# Patient Record
Sex: FEMALE | Race: WHITE | NOT HISPANIC OR LATINO
[De-identification: names, ages, dates, MRNs, and addresses within clinical notes are randomized per-mention and may not be internally consistent; named-entity substitution may affect disease eponyms.]

---

## 2018-01-31 ENCOUNTER — TRANSCRIPTION ENCOUNTER (OUTPATIENT)
Age: 58
End: 2018-01-31

## 2018-07-22 PROBLEM — Z00.00 ENCOUNTER FOR PREVENTIVE HEALTH EXAMINATION: Status: ACTIVE | Noted: 2018-07-22

## 2018-08-20 ENCOUNTER — RESULT CHARGE (OUTPATIENT)
Age: 58
End: 2018-08-20

## 2018-08-20 ENCOUNTER — APPOINTMENT (OUTPATIENT)
Dept: OBGYN | Facility: CLINIC | Age: 58
End: 2018-08-20
Payer: COMMERCIAL

## 2018-08-20 VITALS
OXYGEN SATURATION: 98 % | TEMPERATURE: 98.7 F | SYSTOLIC BLOOD PRESSURE: 100 MMHG | HEIGHT: 64 IN | BODY MASS INDEX: 50.02 KG/M2 | RESPIRATION RATE: 18 BRPM | WEIGHT: 293 LBS | HEART RATE: 64 BPM | DIASTOLIC BLOOD PRESSURE: 62 MMHG

## 2018-08-20 PROCEDURE — 81003 URINALYSIS AUTO W/O SCOPE: CPT | Mod: QW

## 2018-08-20 PROCEDURE — 82270 OCCULT BLOOD FECES: CPT

## 2018-08-20 PROCEDURE — 99386 PREV VISIT NEW AGE 40-64: CPT

## 2018-09-03 LAB
BILIRUB UR QL STRIP: NORMAL
CLARITY UR: CLEAR
COLLECTION METHOD: NORMAL
CYTOLOGY CVX/VAG DOC THIN PREP: NORMAL
GLUCOSE UR-MCNC: NORMAL
HCG UR QL: 0.2 EU/DL
HGB UR QL STRIP.AUTO: NORMAL
HPV HIGH+LOW RISK DNA PNL CVX: NOT DETECTED
KETONES UR-MCNC: NORMAL
LEUKOCYTE ESTERASE UR QL STRIP: NORMAL
NITRITE UR QL STRIP: NORMAL
PH UR STRIP: 5.5
PROT UR STRIP-MCNC: NORMAL
SP GR UR STRIP: NORMAL

## 2018-09-09 ENCOUNTER — FORM ENCOUNTER (OUTPATIENT)
Age: 58
End: 2018-09-09

## 2018-09-10 ENCOUNTER — APPOINTMENT (OUTPATIENT)
Dept: RADIOLOGY | Facility: CLINIC | Age: 58
End: 2018-09-10
Payer: COMMERCIAL

## 2018-09-10 ENCOUNTER — OUTPATIENT (OUTPATIENT)
Dept: OUTPATIENT SERVICES | Facility: HOSPITAL | Age: 58
LOS: 1 days | End: 2018-09-10
Payer: COMMERCIAL

## 2018-09-10 ENCOUNTER — APPOINTMENT (OUTPATIENT)
Dept: MAMMOGRAPHY | Facility: CLINIC | Age: 58
End: 2018-09-10
Payer: COMMERCIAL

## 2018-09-10 DIAGNOSIS — Z00.8 ENCOUNTER FOR OTHER GENERAL EXAMINATION: ICD-10-CM

## 2018-09-10 DIAGNOSIS — R93.8 ABNORMAL FINDINGS ON DIAGNOSTIC IMAGING OF OTHER SPECIFIED BODY STRUCTURES: ICD-10-CM

## 2018-09-10 PROCEDURE — 77080 DXA BONE DENSITY AXIAL: CPT

## 2018-09-10 PROCEDURE — 77063 BREAST TOMOSYNTHESIS BI: CPT | Mod: 26

## 2018-09-10 PROCEDURE — 77067 SCR MAMMO BI INCL CAD: CPT

## 2018-09-10 PROCEDURE — 77067 SCR MAMMO BI INCL CAD: CPT | Mod: 26

## 2018-09-10 PROCEDURE — 77080 DXA BONE DENSITY AXIAL: CPT | Mod: 26

## 2018-09-10 PROCEDURE — 77063 BREAST TOMOSYNTHESIS BI: CPT

## 2018-09-11 ENCOUNTER — RESULT REVIEW (OUTPATIENT)
Age: 58
End: 2018-09-11

## 2018-10-04 ENCOUNTER — RESULT REVIEW (OUTPATIENT)
Age: 58
End: 2018-10-04

## 2018-10-07 ENCOUNTER — FORM ENCOUNTER (OUTPATIENT)
Age: 58
End: 2018-10-07

## 2018-10-08 ENCOUNTER — OUTPATIENT (OUTPATIENT)
Dept: OUTPATIENT SERVICES | Facility: HOSPITAL | Age: 58
LOS: 1 days | End: 2018-10-08
Payer: COMMERCIAL

## 2018-10-08 ENCOUNTER — APPOINTMENT (OUTPATIENT)
Dept: MAMMOGRAPHY | Facility: CLINIC | Age: 58
End: 2018-10-08
Payer: COMMERCIAL

## 2018-10-08 ENCOUNTER — APPOINTMENT (OUTPATIENT)
Dept: ULTRASOUND IMAGING | Facility: CLINIC | Age: 58
End: 2018-10-08
Payer: COMMERCIAL

## 2018-10-08 DIAGNOSIS — Z00.8 ENCOUNTER FOR OTHER GENERAL EXAMINATION: ICD-10-CM

## 2018-10-08 PROCEDURE — G0279: CPT | Mod: 26

## 2018-10-08 PROCEDURE — 77065 DX MAMMO INCL CAD UNI: CPT

## 2018-10-08 PROCEDURE — G0279: CPT

## 2018-10-08 PROCEDURE — 77065 DX MAMMO INCL CAD UNI: CPT | Mod: 26,RT

## 2018-10-23 ENCOUNTER — FORM ENCOUNTER (OUTPATIENT)
Age: 58
End: 2018-10-23

## 2018-10-24 ENCOUNTER — APPOINTMENT (OUTPATIENT)
Dept: ULTRASOUND IMAGING | Facility: CLINIC | Age: 58
End: 2018-10-24
Payer: COMMERCIAL

## 2018-10-24 ENCOUNTER — OUTPATIENT (OUTPATIENT)
Dept: OUTPATIENT SERVICES | Facility: HOSPITAL | Age: 58
LOS: 1 days | End: 2018-10-24
Payer: COMMERCIAL

## 2018-10-24 DIAGNOSIS — Z00.8 ENCOUNTER FOR OTHER GENERAL EXAMINATION: ICD-10-CM

## 2018-10-24 PROCEDURE — 76642 ULTRASOUND BREAST LIMITED: CPT

## 2018-10-24 PROCEDURE — 76642 ULTRASOUND BREAST LIMITED: CPT | Mod: 26,RT

## 2019-12-17 ENCOUNTER — APPOINTMENT (OUTPATIENT)
Dept: NEUROLOGY | Facility: CLINIC | Age: 59
End: 2019-12-17
Payer: COMMERCIAL

## 2019-12-17 VITALS
HEART RATE: 75 BPM | SYSTOLIC BLOOD PRESSURE: 103 MMHG | WEIGHT: 280 LBS | BODY MASS INDEX: 47.8 KG/M2 | DIASTOLIC BLOOD PRESSURE: 64 MMHG | HEIGHT: 64 IN

## 2019-12-17 DIAGNOSIS — M54.2 CERVICALGIA: ICD-10-CM

## 2019-12-17 DIAGNOSIS — G89.29 LOW BACK PAIN: ICD-10-CM

## 2019-12-17 DIAGNOSIS — G89.29 CERVICALGIA: ICD-10-CM

## 2019-12-17 DIAGNOSIS — M54.5 LOW BACK PAIN: ICD-10-CM

## 2019-12-17 PROCEDURE — 99204 OFFICE O/P NEW MOD 45 MIN: CPT

## 2019-12-17 RX ORDER — METFORMIN HYDROCHLORIDE 500 MG/1
500 TABLET, COATED ORAL
Refills: 0 | Status: ACTIVE | COMMUNITY

## 2019-12-17 NOTE — DISCUSSION/SUMMARY
[FreeTextEntry1] : Patient with a history of recent diagnosis of diabetes presented with recent history of dizziness lightheadedness, and trouble processing and episodic blanking out with memory lapses rule out CNS pathology.\par \par Rule out seizure episodes or vascular events.\par \par Get copies of cardiac workup in your office.\par \par MRI of the brain patient states she is claustrophobic need to go to standup MRI.\par \par Repeat B12 levels low-normal range 245.\par \par EEG to rule out CNS pathology.\par \par If the above workup is negative COG testing.\par \par Carotid Doppler done in your office and get reports.\par \par Adequate control of blood sugar and cholesterol.\par \par Followup evaluation after workup is completed.\par \par Recommend patient keep a log of the events.\par \par Followup evaluation after workup is completed.\par \par Patient education provided and discussed with the patient.\par \par \par \par

## 2019-12-17 NOTE — HISTORY OF PRESENT ILLNESS
[FreeTextEntry1] : She is 59-year-old patient coming here for evaluation for episodes of dizziness, lightheadedness and trouble processing things and blanking out periodically for the last 6-7 months on and off episodically.\par \par Patient denies of any loss of consciousness associated with the symptoms no headaches no focal weakness. Most the symptoms are with episodic memory lapses with foggy sensation in the head with inability to focus or lapses unable to get words out right with difficulty while driving suddenly unable to recall where she was going or recall the way to the places. Denies of any trouble with the work. No trouble concentration or finishing up tasks at work.\par \par No prior problems in the past symptoms started only in the summer and aggravated recently for  last 4-6 weeks. No history of trauma or injuries associated with the symptoms. Taken off Crestor  which was started more than a year ago for elevated cholesterol. Recently metformin was in twice a day but her symptoms started before the doses were adjusted. CAT scan brain was done did not reveal an acute pathology. Lab work showed elevated cholesterol and low normal B12 levels hemoglobin A1c levels at 6.9.

## 2019-12-17 NOTE — REASON FOR VISIT
[Consultation] : a consultation visit [FreeTextEntry1] : Evaluation for Pt with episodes of Dizziness, lightheadedness and trouble processing things and blanking out

## 2019-12-17 NOTE — REVIEW OF SYSTEMS
[Decr. Concentrating Ability] : decreased concentrating ability [Memory Lapses or Loss] : memory loss [Dizziness] : dizziness [Changed Thought Patterns] : changed thought patterns [Lightheadedness] : lightheadedness [Negative] : Endocrine

## 2019-12-17 NOTE — PHYSICAL EXAM
[General Appearance - Alert] : alert [General Appearance - In No Acute Distress] : in no acute distress [Impaired Insight] : insight and judgment were intact [Oriented To Time, Place, And Person] : oriented to person, place, and time [Affect] : the affect was normal [Person] : oriented to person [Time] : oriented to time [Place] : oriented to place [Concentration Intact] : normal concentrating ability [Visual Intact] : visual attention was ~T not ~L decreased [Naming Objects] : no difficulty naming common objects [Writing A Sentence] : no difficulty writing a sentence [Repeating Phrases] : no difficulty repeating a phrase [Fluency] : fluency intact [Comprehension] : comprehension intact [Cranial Nerves Optic (II)] : visual acuity intact bilaterally,  visual fields full to confrontation, pupils equal round and reactive to light [Past History] : adequate knowledge of personal past history [Reading] : reading intact [Cranial Nerves Oculomotor (III)] : extraocular motion intact [Cranial Nerves Trigeminal (V)] : facial sensation intact symmetrically [Cranial Nerves Facial (VII)] : face symmetrical [Cranial Nerves Glossopharyngeal (IX)] : tongue and palate midline [Cranial Nerves Accessory (XI - Cranial And Spinal)] : head turning and shoulder shrug symmetric [Cranial Nerves Vestibulocochlear (VIII)] : hearing was intact bilaterally [Motor Strength] : muscle strength was normal in all four extremities [Cranial Nerves Hypoglossal (XII)] : there was no tongue deviation with protrusion [No Muscle Atrophy] : normal bulk in all four extremities [Balance] : balance was intact [Sensation Tactile Decrease] : light touch was intact [Past-pointing] : there was no past-pointing [Tremor] : no tremor present [2+] : Brachioradialis left 2+ [Plantar Reflex Right Only] : normal on the right [1+] : Ankle jerk left 1+ [FreeTextEntry4] : MMSE scores 30/30 [Plantar Reflex Left Only] : normal on the left [Sclera] : the sclera and conjunctiva were normal [Extraocular Movements] : extraocular movements were intact [PERRL With Normal Accommodation] : pupils were equal in size, round, reactive to light, with normal accommodation [Outer Ear] : the ears and nose were normal in appearance [Neck Cervical Mass (___cm)] : no neck mass was observed [Neck Appearance] : the appearance of the neck was normal [Oropharynx] : the oropharynx was normal [Thyroid Diffuse Enlargement] : the thyroid was not enlarged [Jugular Venous Distention Increased] : there was no jugular-venous distention [Thyroid Nodule] : there were no palpable thyroid nodules [Auscultation Breath Sounds / Voice Sounds] : lungs were clear to auscultation bilaterally [Heart Rate And Rhythm] : heart rate was normal and rhythm regular [Murmurs] : no murmurs [Heart Sounds] : normal S1 and S2 [Heart Sounds Gallop] : no gallops [Full Pulse] : the pedal pulses are present [Heart Sounds Pericardial Friction Rub] : no pericardial rub [Edema] : there was no peripheral edema [Bowel Sounds] : normal bowel sounds [Abdomen Soft] : soft [Abdomen Tenderness] : non-tender [Abdomen Mass (___ Cm)] : no abdominal mass palpated [No CVA Tenderness] : no ~M costovertebral angle tenderness [Nail Clubbing] : no clubbing  or cyanosis of the fingernails [No Spinal Tenderness] : no spinal tenderness [Abnormal Walk] : normal gait [Musculoskeletal - Swelling] : no joint swelling seen [Motor Tone] : muscle strength and tone were normal [Skin Color & Pigmentation] : normal skin color and pigmentation [Skin Turgor] : normal skin turgor [] : no rash

## 2019-12-27 ENCOUNTER — APPOINTMENT (OUTPATIENT)
Dept: NEUROLOGY | Facility: CLINIC | Age: 59
End: 2019-12-27
Payer: COMMERCIAL

## 2019-12-27 PROCEDURE — 95816 EEG AWAKE AND DROWSY: CPT

## 2020-01-08 ENCOUNTER — APPOINTMENT (OUTPATIENT)
Dept: OBGYN | Facility: CLINIC | Age: 60
End: 2020-01-08
Payer: COMMERCIAL

## 2020-01-08 VITALS
WEIGHT: 280 LBS | BODY MASS INDEX: 47.8 KG/M2 | SYSTOLIC BLOOD PRESSURE: 110 MMHG | HEIGHT: 64 IN | DIASTOLIC BLOOD PRESSURE: 70 MMHG

## 2020-01-08 DIAGNOSIS — Z86.39 PERSONAL HISTORY OF OTHER ENDOCRINE, NUTRITIONAL AND METABOLIC DISEASE: ICD-10-CM

## 2020-01-08 DIAGNOSIS — Z87.42 PERSONAL HISTORY OF OTHER DISEASES OF THE FEMALE GENITAL TRACT: ICD-10-CM

## 2020-01-08 DIAGNOSIS — Z01.419 ENCOUNTER FOR GYNECOLOGICAL EXAMINATION (GENERAL) (ROUTINE) W/OUT ABNORMAL FINDINGS: ICD-10-CM

## 2020-01-08 PROCEDURE — 82270 OCCULT BLOOD FECES: CPT

## 2020-01-08 PROCEDURE — 99396 PREV VISIT EST AGE 40-64: CPT

## 2020-01-08 NOTE — COUNSELING
[Exercise] : exercise [Breast Self Exam] : breast self exam [Nutrition] : nutrition [Vitamins/Supplements] : vitamins/supplements [Weight Management] : weight management

## 2020-01-08 NOTE — PHYSICAL EXAM
[Alert] : alert [Awake] : awake [Acute Distress] : no acute distress [Mass] : no breast mass [Nipple Discharge] : no nipple discharge [Axillary LAD] : no axillary lymphadenopathy [Soft] : soft [Tender] : non tender [Oriented x3] : oriented to person, place, and time [Normal] : uterus [No Bleeding] : there was no active vaginal bleeding [No Tenderness] : no rectal tenderness [Uterine Adnexae] : were not tender and not enlarged [Pap Obtained] : a Pap smear was performed [Occult Blood] : occult blood test from digital rectal exam was negative [Nl Sphincter Tone] : normal sphincter tone

## 2020-01-08 NOTE — CHIEF COMPLAINT
[Annual Visit] : annual visit [FreeTextEntry1] : Patient is a 59-year-old female presents for routine annual gynecologic examination. Patient states her last menstrual periods 8 years ago. Patient states she had an episode of one week of bleeding in November 2019. Discussed this issue with patient advised workup and evaluation for postmenopausal bleeding including pelvic ultrasound and an in office hysteroscopy and endometrial biopsy. Risks benefits and alternatives reviewed. Patient's last mammogram was October 2018

## 2020-01-10 LAB — HPV HIGH+LOW RISK DNA PNL CVX: NOT DETECTED

## 2020-02-04 ENCOUNTER — FORM ENCOUNTER (OUTPATIENT)
Age: 60
End: 2020-02-04

## 2020-02-05 ENCOUNTER — OUTPATIENT (OUTPATIENT)
Dept: OUTPATIENT SERVICES | Facility: HOSPITAL | Age: 60
LOS: 1 days | End: 2020-02-05
Payer: COMMERCIAL

## 2020-02-05 ENCOUNTER — APPOINTMENT (OUTPATIENT)
Dept: ULTRASOUND IMAGING | Facility: CLINIC | Age: 60
End: 2020-02-05
Payer: COMMERCIAL

## 2020-02-05 ENCOUNTER — APPOINTMENT (OUTPATIENT)
Dept: MAMMOGRAPHY | Facility: CLINIC | Age: 60
End: 2020-02-05
Payer: COMMERCIAL

## 2020-02-05 DIAGNOSIS — Z00.8 ENCOUNTER FOR OTHER GENERAL EXAMINATION: ICD-10-CM

## 2020-02-05 PROCEDURE — 77067 SCR MAMMO BI INCL CAD: CPT

## 2020-02-05 PROCEDURE — 76830 TRANSVAGINAL US NON-OB: CPT | Mod: 26

## 2020-02-05 PROCEDURE — 77063 BREAST TOMOSYNTHESIS BI: CPT | Mod: 26

## 2020-02-05 PROCEDURE — 76856 US EXAM PELVIC COMPLETE: CPT | Mod: 26

## 2020-02-05 PROCEDURE — 77067 SCR MAMMO BI INCL CAD: CPT | Mod: 26

## 2020-02-05 PROCEDURE — 76830 TRANSVAGINAL US NON-OB: CPT

## 2020-02-05 PROCEDURE — 76856 US EXAM PELVIC COMPLETE: CPT

## 2020-02-05 PROCEDURE — 76641 ULTRASOUND BREAST COMPLETE: CPT | Mod: 26,50

## 2020-02-05 PROCEDURE — 77063 BREAST TOMOSYNTHESIS BI: CPT

## 2020-02-05 PROCEDURE — 76641 ULTRASOUND BREAST COMPLETE: CPT

## 2020-02-10 ENCOUNTER — APPOINTMENT (OUTPATIENT)
Dept: NEUROLOGY | Facility: CLINIC | Age: 60
End: 2020-02-10
Payer: COMMERCIAL

## 2020-02-10 VITALS
HEIGHT: 64 IN | BODY MASS INDEX: 46.1 KG/M2 | WEIGHT: 270 LBS | HEART RATE: 78 BPM | SYSTOLIC BLOOD PRESSURE: 126 MMHG | DIASTOLIC BLOOD PRESSURE: 80 MMHG

## 2020-02-10 DIAGNOSIS — R42 DIZZINESS AND GIDDINESS: ICD-10-CM

## 2020-02-10 DIAGNOSIS — R68.89 OTHER GENERAL SYMPTOMS AND SIGNS: ICD-10-CM

## 2020-02-10 DIAGNOSIS — R41.3 OTHER AMNESIA: ICD-10-CM

## 2020-02-10 DIAGNOSIS — R41.89 OTHER SYMPTOMS AND SIGNS INVOLVING COGNITIVE FUNCTIONS AND AWARENESS: ICD-10-CM

## 2020-02-10 DIAGNOSIS — E53.8 DEFICIENCY OF OTHER SPECIFIED B GROUP VITAMINS: ICD-10-CM

## 2020-02-10 PROCEDURE — 96132 NRPSYC TST EVAL PHYS/QHP 1ST: CPT | Mod: 59

## 2020-02-10 PROCEDURE — 99214 OFFICE O/P EST MOD 30 MIN: CPT

## 2020-02-10 NOTE — DISCUSSION/SUMMARY
[FreeTextEntry1] : She recently is to have diabetes and complaining about dizziness and lightheadedness and trouble processing and episodic memory lapses.\par \par Herself her lab work and neurologic workup only positive for B12 level low normal range. Rest of her workup had been negative.\par \par Her neuropsychological COG score were above average.\par \par Recommendation to continue vitamin B12 supplements.\par \par Would not recommend any medications at this time.\par \par Followup evaluation in 3 months or as needed.\par \par Patient education provided.\par \par For dizziness recommend ENT evaluation and orthostatic blood pressure recording.\par \par Followup with you for other medical problems.

## 2020-02-10 NOTE — PHYSICAL EXAM
[General Appearance - In No Acute Distress] : in no acute distress [Oriented To Time, Place, And Person] : oriented to person, place, and time [General Appearance - Alert] : alert [Impaired Insight] : insight and judgment were intact [Affect] : the affect was normal [Person] : oriented to person [Time] : oriented to time [Place] : oriented to place [Naming Objects] : no difficulty naming common objects [Concentration Intact] : normal concentrating ability [Visual Intact] : visual attention was ~T not ~L decreased [Writing A Sentence] : no difficulty writing a sentence [Repeating Phrases] : no difficulty repeating a phrase [Reading] : reading intact [Comprehension] : comprehension intact [Fluency] : fluency intact [Cranial Nerves Optic (II)] : visual acuity intact bilaterally,  visual fields full to confrontation, pupils equal round and reactive to light [Past History] : adequate knowledge of personal past history [Cranial Nerves Oculomotor (III)] : extraocular motion intact [Cranial Nerves Facial (VII)] : face symmetrical [Cranial Nerves Trigeminal (V)] : facial sensation intact symmetrically [Cranial Nerves Glossopharyngeal (IX)] : tongue and palate midline [Cranial Nerves Vestibulocochlear (VIII)] : hearing was intact bilaterally [Cranial Nerves Accessory (XI - Cranial And Spinal)] : head turning and shoulder shrug symmetric [Cranial Nerves Hypoglossal (XII)] : there was no tongue deviation with protrusion [Motor Strength] : muscle strength was normal in all four extremities [Sensation Tactile Decrease] : light touch was intact [No Muscle Atrophy] : normal bulk in all four extremities [Balance] : balance was intact [Past-pointing] : there was no past-pointing [Tremor] : no tremor present [2+] : Brachioradialis left 2+ [Plantar Reflex Right Only] : normal on the right [1+] : Ankle jerk left 1+ [FreeTextEntry4] : MMSE scores 30/30 [Plantar Reflex Left Only] : normal on the left [Sclera] : the sclera and conjunctiva were normal [PERRL With Normal Accommodation] : pupils were equal in size, round, reactive to light, with normal accommodation [Extraocular Movements] : extraocular movements were intact [Outer Ear] : the ears and nose were normal in appearance [Oropharynx] : the oropharynx was normal [Neck Appearance] : the appearance of the neck was normal [Neck Cervical Mass (___cm)] : no neck mass was observed [Thyroid Diffuse Enlargement] : the thyroid was not enlarged [Jugular Venous Distention Increased] : there was no jugular-venous distention [Thyroid Nodule] : there were no palpable thyroid nodules [Auscultation Breath Sounds / Voice Sounds] : lungs were clear to auscultation bilaterally [Heart Rate And Rhythm] : heart rate was normal and rhythm regular [Murmurs] : no murmurs [Heart Sounds Gallop] : no gallops [Heart Sounds] : normal S1 and S2 [Full Pulse] : the pedal pulses are present [Heart Sounds Pericardial Friction Rub] : no pericardial rub [Abdomen Soft] : soft [Edema] : there was no peripheral edema [Bowel Sounds] : normal bowel sounds [Abdomen Tenderness] : non-tender [Abdomen Mass (___ Cm)] : no abdominal mass palpated [No CVA Tenderness] : no ~M costovertebral angle tenderness [No Spinal Tenderness] : no spinal tenderness [Abnormal Walk] : normal gait [Musculoskeletal - Swelling] : no joint swelling seen [Nail Clubbing] : no clubbing  or cyanosis of the fingernails [Skin Color & Pigmentation] : normal skin color and pigmentation [Skin Turgor] : normal skin turgor [Motor Tone] : muscle strength and tone were normal [] : no rash

## 2020-02-10 NOTE — HISTORY OF PRESENT ILLNESS
[FreeTextEntry1] : She is 59-year-old patient coming in for a followup evaluation for persistent dizziness with episodic memory lapses on for the sensation and inability to focus and memory lapses.\par \par Patient had EEG and MRI scan done did not reveal any acute pathology. Today patient coming here for COG testing and evaluated. No other focal symptoms.\par \par COG testing completed .

## 2020-02-10 NOTE — DATA REVIEWED
[de-identified] : Normal. [de-identified] : COG test  Global Cognitive scores 106.3\par More than 1 standard Deviation below average in the following domains: None\par Below average in the following cognitive domains : Motor skills 97.9\par Above  average in the following cognitive domains : \par Memory : 108.5\par Executive function :104.2\par Attention :107.3\par Information processing speed :112.8\par Visual spacial : 108.9\par Verbal function : 104.2\par Global Cognitive Score : above average. [de-identified] : Normal Brain \par bilat Maxillary Mucosal retention cysts

## 2020-02-10 NOTE — REVIEW OF SYSTEMS
[Memory Lapses or Loss] : memory loss [Changed Thought Patterns] : changed thought patterns [Decr. Concentrating Ability] : decreased concentrating ability [Dizziness] : dizziness [Lightheadedness] : lightheadedness [Negative] : Integumentary

## 2020-02-10 NOTE — REASON FOR VISIT
[FreeTextEntry1] : Pt coming for COG testing for memory evaluation [Follow-Up: _____] : a [unfilled] follow-up visit

## 2020-02-17 ENCOUNTER — APPOINTMENT (OUTPATIENT)
Dept: OBGYN | Facility: CLINIC | Age: 60
End: 2020-02-17
Payer: COMMERCIAL

## 2020-02-17 ENCOUNTER — LABORATORY RESULT (OUTPATIENT)
Age: 60
End: 2020-02-17

## 2020-02-17 VITALS — SYSTOLIC BLOOD PRESSURE: 130 MMHG | DIASTOLIC BLOOD PRESSURE: 80 MMHG

## 2020-02-17 DIAGNOSIS — R93.5 ABNORMAL FINDINGS ON DIAGNOSTIC IMAGING OF OTHER ABDOMINAL REGIONS, INCLUDING RETROPERITONEUM: ICD-10-CM

## 2020-02-17 DIAGNOSIS — N95.0 POSTMENOPAUSAL BLEEDING: ICD-10-CM

## 2020-02-17 DIAGNOSIS — R93.89 ABNORMAL FINDINGS ON DIAGNOSTIC IMAGING OF OTHER SPECIFIED BODY STRUCTURES: ICD-10-CM

## 2020-02-17 DIAGNOSIS — Z87.42 PERSONAL HISTORY OF OTHER DISEASES OF THE FEMALE GENITAL TRACT: ICD-10-CM

## 2020-02-17 PROCEDURE — 58558Z: CUSTOM

## 2020-02-17 NOTE — PROCEDURE
[Risks] : risks [Endometrial Biopsy] : Endometrial biopsy [Benefits] : benefits [Patient] : patient [Alternatives] : alternatives [Infection] : infection [Allergic Reaction] : allergic reaction [Uterine Perforation] : uterine perforation [Bleeding] : bleeding [Neg Pregnancy Test] : a pregnancy test was negative [CONSENT OBTAINED] : written consent was obtained prior to the procedure. [Pain] : pain [Betadine] : Betadine [Ibuprofen ___ mg] : ibuprofen [unfilled] ~Umg [Paracervical Block] : A paracervical block was performed using [___ mL Injected] : [unfilled] ~UmL [1%] : 1% [Without Epi] : without epinephrine [Tenaculum] : a single toothed tenaculum [Anteverted] : anteverted [Required Dilation] : required dilation [Moderate] : a moderate [Pipelle] : a Pipelle endometrial suction curette [Tolerated Well] : the patient tolerated the procedure well [Sent to Histology] : the specimen was place in buffered formalin and sent for pathlogy [de-identified] : Cytotec [No Complications] : there were no complications

## 2020-02-24 ENCOUNTER — APPOINTMENT (OUTPATIENT)
Dept: OBGYN | Facility: CLINIC | Age: 60
End: 2020-02-24
Payer: COMMERCIAL

## 2020-02-24 PROCEDURE — 99215 OFFICE O/P EST HI 40 MIN: CPT

## 2020-02-24 NOTE — CHIEF COMPLAINT
[Follow Up] : follow up GYN visit [FreeTextEntry1] : Patient is a 59-year-old female that presents with her  for a consultation on an urgent basis regarding her endometrial biopsy pathology results. Patient had a hysteroscopy and endometrial biopsy secondary complaints of postmenopausal bleeding. 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 This is also discussed with the patient and her  in detail. Patient advised to have a pelvic MRI and to schedule a gynecologic oncologist consultation. Referral given. Questions answered. Patient states she understands. Will be referred to nurse oncology navigator.\par \par 45 minutes of face time

## 2020-03-04 ENCOUNTER — APPOINTMENT (OUTPATIENT)
Dept: GYNECOLOGIC ONCOLOGY | Facility: CLINIC | Age: 60
End: 2020-03-04
Payer: COMMERCIAL

## 2020-03-04 VITALS — BODY MASS INDEX: 46.1 KG/M2 | WEIGHT: 270 LBS | HEIGHT: 64 IN

## 2020-03-04 DIAGNOSIS — E78.5 HYPERLIPIDEMIA, UNSPECIFIED: ICD-10-CM

## 2020-03-04 DIAGNOSIS — Z78.9 OTHER SPECIFIED HEALTH STATUS: ICD-10-CM

## 2020-03-04 DIAGNOSIS — R73.03 PREDIABETES.: ICD-10-CM

## 2020-03-04 PROCEDURE — 99245 OFF/OP CONSLTJ NEW/EST HI 55: CPT

## 2020-03-04 NOTE — OB HISTORY
[Total Preg ___] : : [unfilled] [Vaginal ___] : [unfilled] vaginal delivery(s) [Definite ___ (Date)] : the last menstrual period was [unfilled]

## 2020-03-19 ENCOUNTER — APPOINTMENT (OUTPATIENT)
Dept: UROGYNECOLOGY | Facility: CLINIC | Age: 60
End: 2020-03-19

## 2020-03-19 ENCOUNTER — RESULT CHARGE (OUTPATIENT)
Age: 60
End: 2020-03-19

## 2020-03-19 ENCOUNTER — APPOINTMENT (OUTPATIENT)
Dept: UROGYNECOLOGY | Facility: CLINIC | Age: 60
End: 2020-03-19
Payer: COMMERCIAL

## 2020-03-19 DIAGNOSIS — R35.0 FREQUENCY OF MICTURITION: ICD-10-CM

## 2020-03-19 LAB
BILIRUB UR QL STRIP: NEGATIVE
CLARITY UR: CLEAR
COLLECTION METHOD: NORMAL
GLUCOSE UR-MCNC: NEGATIVE
HCG UR QL: 0.2 EU/DL
HGB UR QL STRIP.AUTO: NEGATIVE
KETONES UR-MCNC: NORMAL
LEUKOCYTE ESTERASE UR QL STRIP: NEGATIVE
NITRITE UR QL STRIP: NEGATIVE
PH UR STRIP: 5.5
PROT UR STRIP-MCNC: NEGATIVE
SP GR UR STRIP: 1.02

## 2020-03-19 PROCEDURE — 81003 URINALYSIS AUTO W/O SCOPE: CPT | Mod: NC,QW

## 2020-03-19 PROCEDURE — 51725 SIMPLE CYSTOMETROGRAM: CPT

## 2020-03-19 PROCEDURE — 99244 OFF/OP CNSLTJ NEW/EST MOD 40: CPT | Mod: 25

## 2020-03-19 NOTE — PROCEDURE
[First Sensation: _____ ml] : first sensation at [unfilled] ml [Fullness: ____ ml] : fullness at [unfilled] ml [longterm ____ml] : longterm [unfilled] ml [Stress Urinary Incontinence] : stress urinary incontinence was observed

## 2020-03-19 NOTE — PHYSICAL EXAM
[No Acute Distress] : in no acute distress [Well developed] : well developed [Well Nourished] : ~L well nourished [Oriented x3] : oriented to person, place, and time [No Edema] : ~T edema was not present [Warm and Dry] : was warm and dry to touch [Normal Gait] : gait was normal [Normal Appearance] : general appearance was normal [3] : 3 [Aa ____] : Aa [unfilled] [Ba ____] : Ba [unfilled] [C ____] : C [unfilled] [GH ____] : GH [unfilled] [PB ____] : PB [unfilled] [TVL ____] : TVL  [unfilled] [Ap ____] : Ap [unfilled] [Bp ____] : Bp [unfilled] [D ____] : D [unfilled] [Normal] : no abnormalities [Post Void Residual ____ml] : post void residual via catheterization was [unfilled] ml [Tenderness] : ~T no ~M abdominal tenderness observed [Distended] : not distended [Hernia] : no hernia observed [FreeTextEntry3] : negative empty stress test

## 2020-03-19 NOTE — DISCUSSION/SUMMARY
[FreeTextEntry1] : Mrs. LOPEZ is 59 with urinary incontinence, urgency, frequency and endometrial cancer. I did simple cystometry today that confirmed stress leaks. We talked about the types of urinary incontinence and the treatment options. We reviewed physical therapy, medication, surgery, vaginal inserts and third line treatments. I gave her some literature on pelvic muscle strengthening and slings. I was able to answer all of her questions. She is scheduled for hysterectomy for her endometrial cancer and we discussed performing a sling at the time of her surgery. She will return for full surgical discussion. \par

## 2020-03-19 NOTE — OB HISTORY
[Vaginal ___] : [unfilled] vaginal delivery(s) [Definite ___ (Date)] : the last menstrual period was [unfilled] [Last Pap Smear ___] : date of last pap smear was on [unfilled] [Abnormal Pap Smear] : normal pap smear [Taking Estrogens] : is not taking estrogen replacement [Sexually Active] : is not sexually active [FreeTextEntry1] : Largest baby 8#14.

## 2020-03-19 NOTE — HISTORY OF PRESENT ILLNESS
[FreeTextEntry1] : 60 yo  female with complaints of leaking of urine. Been leaking for many years since the pregnancy of her first child. It is progressively getting worse. Leaks with activity, cough, sneeze. Sometimes it happens with incidental loss. She does have some urgency and sometimes a small amount will come out. Wears pads all the time. Feels she voids frequently. Gets up 2-3 times at night. Has dry mouth at night and she admits to drinking water overnight to help with they dry mouth. Feels she empties fully. No prolapse. No bowel complaints. Having hysterectomy for endometrial ca next month and hoping that if surgery can help with the leaking it can be done at the same time. Has a history of blood clot that was treated with Coumadin in her early 50's. They thought it was because she was on OCP.

## 2020-03-24 NOTE — CHIEF COMPLAINT
[FreeTextEntry1] : [place] Office\par \par [facility] [phone] at [address] [place] [address]\par

## 2020-03-24 NOTE — PHYSICAL EXAM
[Normal] : Bimanual Exam: Normal [de-identified] : Patient was interviewed and examined with chaperone present. Name of Chaperone: Pippa Galindo PA-C

## 2020-03-24 NOTE — LETTER BODY
[FreeTextEntry2] : Name: DARRELL LOPEZ \par : Aug 20 1960 \par \par Date of Visit: Mar 04, 2020 \par \par Dear Dr. Sorto\par \par I recently saw our mutual patient, DARRELL LOPEZ , in my office.\par \par Below, please see my findings.\par \par As always, it is my sincere pleasure to have the opportunity to participate in one of your patients' care. Please feel free to contact me with any questions or concerns that you may have regarding her care.\par \par Sincerely yours,\par \par Catherine Marcus MD\par

## 2020-03-24 NOTE — HISTORY OF PRESENT ILLNESS
[FreeTextEntry1] : 60yo  LMP age 52 presents today with referral from Dr. Sorto for PMB. She reports bleeding to have occurred in November, lasting around 4 days with heaviness like a menses. Patient with EMB performed on 20 revealing endometrioid adenocarcinoma FIGO grade 1. US performed that same day revealed size of uterus to be [id number]. Pt. admits to stress incontinence daily. She denies any pelvic pain or issues with bowel habits. \par \par LPAP- 2020, normal\par LMammo- 2020, normal\par LColonoscopy- never, Patient has GI physician and plans to go this year\par

## 2020-03-24 NOTE — ASSESSMENT
[FreeTextEntry1] : 60yo with endometrioid adenocarcinoma FIGO grade 1.\par \par I discussed at length with the patient the nature, purpose, risks, benefits, and alternatives of Robot assisted total laparoscopic hysterectomy, bilateral salpingo-oophorectomy sentinel lymph node dissection and cystoscopy.  The patient understands the risks to include (but not be limited to) bowel injury, bleeding (with the possible need for transfusion), bladder or ureteral injury, infections, deep venous thrombosis, and jessica-operative death.  The patient also understands that her surgery may not be able to be performed robotically and that she may need a laparotomy.  She also understands the limitations of robotic surgery and the possibility of missing a surgical complication with need for subsequent re-exploration.  She agrees to proceed.  She asked numerous questions which were answered to her satisfaction.  She understands the need for a pre-operative bowel preparation and agrees to comply with our instructions.  She also understands the rationale for a cystoscopy at the completion of the procedure and the potential risks of cystoscopy.

## 2020-03-24 NOTE — END OF VISIT
[FreeTextEntry3] : Written by Pippa Galindo PA-C, acting as scribe for Dr. Catherine Marcus.\par  [FreeTextEntry2] : This note accurately reflects the work and decisions made by me.\par

## 2020-03-26 ENCOUNTER — APPOINTMENT (OUTPATIENT)
Dept: UROGYNECOLOGY | Facility: CLINIC | Age: 60
End: 2020-03-26
Payer: COMMERCIAL

## 2020-03-26 PROCEDURE — 99214 OFFICE O/P EST MOD 30 MIN: CPT

## 2020-03-26 RX ORDER — ROSUVASTATIN CALCIUM 5 MG/1
TABLET, FILM COATED ORAL
Refills: 0 | Status: ACTIVE | COMMUNITY

## 2020-03-26 NOTE — DISCUSSION/SUMMARY
[FreeTextEntry1] : Mrs. Kent presented to the office today for counseling regarding her decision for surgical treatment of her urinary incontinence. All pertinent prior studies including urodynamics were reviewed. She was counseled regarding alternative nonsurgical therapies as well as the prognosis with no intervention.\par \par The patient was advised regarding various surgical options including abdominal, laparoscopic and vaginal approaches, allograft (harvesting ones alone tissue), xenograft (using tissue from donor), Impressa and synthetic grafts. The risks and benefits of surgery using graft insertion (mesh) were fully reviewed. She was informed of the potential for improved durability and the inherent risk of mesh use including but not limited to infection, erosion, pain, pain with intercourse, disturbance in bladder function, any of which may require additional surgery for mesh revision. She is aware that the mesh use and her surgery is a permanent mesh.\par \par The general risks of the surgery were reviewed including but not limited to infection, bleeding, surrounding organ or tissue injury, failure meaning continued leaking, voiding dysfunction, needing to go home with a catheter, and the risks of anesthesia.\par \par The approximate length of the surgery hospital stay and postoperative recovery period were reviewed, including a general overview of the convalescence and postoperative followup.\par \par She verbalized a desire to proceed with surgery. Appropriate informed consent was obtained for sling and cysto. All questions were answered to the patient's satisfaction and we are looking to schedule her.

## 2020-05-05 ENCOUNTER — APPOINTMENT (OUTPATIENT)
Dept: RADIOLOGY | Facility: CLINIC | Age: 60
End: 2020-05-05
Payer: COMMERCIAL

## 2020-05-05 ENCOUNTER — OUTPATIENT (OUTPATIENT)
Dept: OUTPATIENT SERVICES | Facility: HOSPITAL | Age: 60
LOS: 1 days | End: 2020-05-05
Payer: COMMERCIAL

## 2020-05-05 DIAGNOSIS — Z00.8 ENCOUNTER FOR OTHER GENERAL EXAMINATION: ICD-10-CM

## 2020-05-05 PROCEDURE — 71046 X-RAY EXAM CHEST 2 VIEWS: CPT

## 2020-05-05 PROCEDURE — 71046 X-RAY EXAM CHEST 2 VIEWS: CPT | Mod: 26

## 2020-05-11 ENCOUNTER — APPOINTMENT (OUTPATIENT)
Dept: NEUROLOGY | Facility: CLINIC | Age: 60
End: 2020-05-11

## 2020-05-12 VITALS
OXYGEN SATURATION: 98 % | RESPIRATION RATE: 16 BRPM | SYSTOLIC BLOOD PRESSURE: 137 MMHG | HEART RATE: 64 BPM | TEMPERATURE: 98 F | HEIGHT: 64 IN | DIASTOLIC BLOOD PRESSURE: 74 MMHG

## 2020-05-12 LAB
ANION GAP SERPL CALC-SCNC: 16 MMOL/L
BASOPHILS # BLD AUTO: 0.03 K/UL
BASOPHILS NFR BLD AUTO: 0.5 %
BUN SERPL-MCNC: 11 MG/DL
CALCIUM SERPL-MCNC: 9.4 MG/DL
CANCER AG125 SERPL-ACNC: 23 U/ML
CHLORIDE SERPL-SCNC: 104 MMOL/L
CO2 SERPL-SCNC: 23 MMOL/L
CREAT SERPL-MCNC: 0.9 MG/DL
EOSINOPHIL # BLD AUTO: 0.11 K/UL
EOSINOPHIL NFR BLD AUTO: 1.7 %
GLUCOSE SERPL-MCNC: 125 MG/DL
HCT VFR BLD CALC: 40.9 %
HGB BLD-MCNC: 12.3 G/DL
IMM GRANULOCYTES NFR BLD AUTO: 0.3 %
LYMPHOCYTES # BLD AUTO: 2.42 K/UL
LYMPHOCYTES NFR BLD AUTO: 37.8 %
MAN DIFF?: NORMAL
MCHC RBC-ENTMCNC: 29 PG
MCHC RBC-ENTMCNC: 30.1 GM/DL
MCV RBC AUTO: 96.5 FL
MONOCYTES # BLD AUTO: 0.44 K/UL
MONOCYTES NFR BLD AUTO: 6.9 %
NEUTROPHILS # BLD AUTO: 3.38 K/UL
NEUTROPHILS NFR BLD AUTO: 52.8 %
PLATELET # BLD AUTO: 237 K/UL
POTASSIUM SERPL-SCNC: 4.1 MMOL/L
RBC # BLD: 4.24 M/UL
RBC # FLD: 12.8 %
SODIUM SERPL-SCNC: 143 MMOL/L
WBC # FLD AUTO: 6.4 K/UL

## 2020-05-12 RX ORDER — CEFOTETAN DISODIUM 1 G
2 VIAL (EA) INJECTION ONCE
Refills: 0 | Status: COMPLETED | OUTPATIENT
Start: 2020-05-19 | End: 2020-05-19

## 2020-05-12 NOTE — H&P PST ADULT - NSICDXPASTMEDICALHX_GEN_ALL_CORE_FT
PAST MEDICAL HISTORY:  Diabetes mellitus     DVT, lower extremity approx 2010   tx with coumadin 6 months    High cholesterol     Sciatica yrs ago    Uterine cancer feb 2020    Vasovagal episode with blood draw

## 2020-05-12 NOTE — ASU PATIENT PROFILE, ADULT - FALL HARM RISK CONCLUSION
----- Message from Jennifer Cordero MD sent at 1/30/2017  9:43 AM CST -----  Please have patient avoid any vitamin D and/or calcium supplements also, keep hydrated with water and have repeat labs in 1 week. Does he have a follow up visit with PCP/ Dr. Boyle or any other MD. He was also to see an endocrinologist for elevated calcium levels, can we find out if he has a referral for that?    Thanks.    Called no answer   Universal Safety Interventions

## 2020-05-12 NOTE — ASU PATIENT PROFILE, ADULT - CAREGIVER PHONE NUMBER
Within an hour after restraint an in person face to face assessment was completed at 0935 am, including an evaluation of the patient's immediate reaction to the intervention, behavioral assessment and review/assessment of history, drugs and medications, recent labs, etc., and behavioral condition.  The patient experienced: No adverse physical outcome from seclusion/restraint initiation.  The intervention of restraint or seclusion needs to continue  Shruthi Clarke MD.       Shruthi Clarke MD  05/07/19 8104     [phone]

## 2020-05-12 NOTE — H&P PST ADULT - ADDITIONAL PE
Limited Physical Examination done in SDS by myself Lina DELEON (History was completed by a different NP prior to today's visit)

## 2020-05-12 NOTE — H&P PST ADULT - NSICDXFAMILYHX_GEN_ALL_CORE_FT
FAMILY HISTORY:  Family history of prostate cancer in father  FH: diverticulitis, mother  FHx: colon cancer, grandfather

## 2020-05-12 NOTE — H&P PST ADULT - COMMENTS
dvt 2010 ( was on BCP)  history obtained on phone physical assessment to be done  am of surgery sono showed cancer uterine jan 2020

## 2020-05-12 NOTE — H&P PST ADULT - MALLAMPATI CLASS
Difficult to evaluate during physical examination in [facility] as no light available to examine oral pharynx/Class III - visualization of the soft palate and the base of the uvula

## 2020-05-12 NOTE — ASU PATIENT PROFILE, ADULT - PMH
Diabetes mellitus    DVT, lower extremity  approx 2010   tx with coumadin 6 months  High cholesterol    Sciatica  yrs ago  Uterine cancer  feb 2020  Vasovagal episode  with blood draw

## 2020-05-12 NOTE — H&P PST ADULT - RS GEN PE MLT RESP DETAILS PC
respirations non-labored/normal/airway patent/diminished breath sounds, R/diminished breath sounds, L

## 2020-05-12 NOTE — H&P PST ADULT - HISTORY OF PRESENT ILLNESS
59 yr old female interviewed on phone for history. recently diagnosed with uterine cancer  2019 had postmenopausal bleeding . LMP   Pap done 2018 normal at that time  repeated 2020 with hysteroscopy and biopsy  .  pt states she was scheduled for hysterectomy and bladder sling on 2020 with Dr. finch and Dr. hi  but was cancelled due to covid  19 pandemic . (pt denies any illness) .

## 2020-05-17 ENCOUNTER — OUTPATIENT (OUTPATIENT)
Dept: OUTPATIENT SERVICES | Facility: HOSPITAL | Age: 60
LOS: 1 days | End: 2020-05-17
Payer: COMMERCIAL

## 2020-05-17 DIAGNOSIS — Z11.59 ENCOUNTER FOR SCREENING FOR OTHER VIRAL DISEASES: ICD-10-CM

## 2020-05-17 DIAGNOSIS — Z98.890 OTHER SPECIFIED POSTPROCEDURAL STATES: Chronic | ICD-10-CM

## 2020-05-17 LAB — SARS-COV-2 RNA SPEC QL NAA+PROBE: SIGNIFICANT CHANGE UP

## 2020-05-17 PROCEDURE — U0003: CPT

## 2020-05-18 ENCOUNTER — TRANSCRIPTION ENCOUNTER (OUTPATIENT)
Age: 60
End: 2020-05-18

## 2020-05-18 ENCOUNTER — FORM ENCOUNTER (OUTPATIENT)
Age: 60
End: 2020-05-18

## 2020-05-19 ENCOUNTER — INPATIENT (INPATIENT)
Facility: HOSPITAL | Age: 60
LOS: 0 days | Discharge: HOME | DRG: 741 | End: 2020-05-20
Attending: OBSTETRICS & GYNECOLOGY | Admitting: OBSTETRICS & GYNECOLOGY
Payer: COMMERCIAL

## 2020-05-19 ENCOUNTER — TRANSCRIPTION ENCOUNTER (OUTPATIENT)
Age: 60
End: 2020-05-19

## 2020-05-19 ENCOUNTER — RESULT REVIEW (OUTPATIENT)
Age: 60
End: 2020-05-19

## 2020-05-19 ENCOUNTER — APPOINTMENT (OUTPATIENT)
Dept: UROGYNECOLOGY | Facility: HOSPITAL | Age: 60
End: 2020-05-19
Payer: COMMERCIAL

## 2020-05-19 DIAGNOSIS — C54.1 MALIGNANT NEOPLASM OF ENDOMETRIUM: ICD-10-CM

## 2020-05-19 DIAGNOSIS — E78.00 PURE HYPERCHOLESTEROLEMIA, UNSPECIFIED: ICD-10-CM

## 2020-05-19 DIAGNOSIS — E11.9 TYPE 2 DIABETES MELLITUS WITHOUT COMPLICATIONS: ICD-10-CM

## 2020-05-19 DIAGNOSIS — Z98.890 OTHER SPECIFIED POSTPROCEDURAL STATES: Chronic | ICD-10-CM

## 2020-05-19 DIAGNOSIS — N39.3 STRESS INCONTINENCE (FEMALE) (MALE): ICD-10-CM

## 2020-05-19 LAB
A1C WITH ESTIMATED AVERAGE GLUCOSE RESULT: 6.3 % — HIGH (ref 4–5.6)
ABO RH CONFIRMATION: SIGNIFICANT CHANGE UP
BLD GP AB SCN SERPL QL: SIGNIFICANT CHANGE UP
ESTIMATED AVERAGE GLUCOSE: 134 MG/DL — HIGH (ref 68–114)
GLUCOSE BLDC GLUCOMTR-MCNC: 106 MG/DL — HIGH (ref 70–99)
GLUCOSE BLDC GLUCOMTR-MCNC: 121 MG/DL — HIGH (ref 70–99)
GLUCOSE BLDC GLUCOMTR-MCNC: 96 MG/DL — SIGNIFICANT CHANGE UP (ref 70–99)

## 2020-05-19 PROCEDURE — 88309 TISSUE EXAM BY PATHOLOGIST: CPT | Mod: 26

## 2020-05-19 PROCEDURE — 88341 IMHCHEM/IMCYTCHM EA ADD ANTB: CPT | Mod: 26

## 2020-05-19 PROCEDURE — 38570 LAPAROSCOPY LYMPH NODE BIOP: CPT

## 2020-05-19 PROCEDURE — 57288 REPAIR BLADDER DEFECT: CPT

## 2020-05-19 PROCEDURE — 58571 TLH W/T/O 250 G OR LESS: CPT | Mod: 80

## 2020-05-19 PROCEDURE — 38570 LAPAROSCOPY LYMPH NODE BIOP: CPT | Mod: 80

## 2020-05-19 PROCEDURE — 88305 TISSUE EXAM BY PATHOLOGIST: CPT | Mod: 26

## 2020-05-19 PROCEDURE — 57425 LAPAROSCOPY SURG COLPOPEXY: CPT | Mod: AS

## 2020-05-19 PROCEDURE — 58571 TLH W/T/O 250 G OR LESS: CPT | Mod: AS

## 2020-05-19 PROCEDURE — 38900 IO MAP OF SENT LYMPH NODE: CPT | Mod: 50

## 2020-05-19 PROCEDURE — 88342 IMHCHEM/IMCYTCHM 1ST ANTB: CPT | Mod: 26

## 2020-05-19 PROCEDURE — 38900 IO MAP OF SENT LYMPH NODE: CPT | Mod: AS,50

## 2020-05-19 PROCEDURE — 38900 IO MAP OF SENT LYMPH NODE: CPT | Mod: 80,50

## 2020-05-19 PROCEDURE — 57425 LAPAROSCOPY SURG COLPOPEXY: CPT | Mod: 80

## 2020-05-19 PROCEDURE — 58571 TLH W/T/O 250 G OR LESS: CPT

## 2020-05-19 PROCEDURE — 38570 LAPAROSCOPY LYMPH NODE BIOP: CPT | Mod: AS

## 2020-05-19 PROCEDURE — 57425 LAPAROSCOPY SURG COLPOPEXY: CPT

## 2020-05-19 PROCEDURE — 88307 TISSUE EXAM BY PATHOLOGIST: CPT | Mod: 26

## 2020-05-19 RX ORDER — SODIUM CHLORIDE 9 MG/ML
3 INJECTION INTRAMUSCULAR; INTRAVENOUS; SUBCUTANEOUS EVERY 8 HOURS
Refills: 0 | Status: DISCONTINUED | OUTPATIENT
Start: 2020-05-19 | End: 2020-05-20

## 2020-05-19 RX ORDER — ENOXAPARIN SODIUM 100 MG/ML
40 INJECTION SUBCUTANEOUS
Qty: 2240 | Refills: 0
Start: 2020-05-19 | End: 2020-06-15

## 2020-05-19 RX ORDER — FENTANYL CITRATE 50 UG/ML
50 INJECTION INTRAVENOUS
Refills: 0 | Status: DISCONTINUED | OUTPATIENT
Start: 2020-05-19 | End: 2020-05-19

## 2020-05-19 RX ORDER — ONDANSETRON 8 MG/1
4 TABLET, FILM COATED ORAL ONCE
Refills: 0 | Status: DISCONTINUED | OUTPATIENT
Start: 2020-05-19 | End: 2020-05-19

## 2020-05-19 RX ORDER — SODIUM CHLORIDE 9 MG/ML
3 INJECTION INTRAMUSCULAR; INTRAVENOUS; SUBCUTANEOUS ONCE
Refills: 0 | Status: COMPLETED | OUTPATIENT
Start: 2020-05-19 | End: 2020-05-19

## 2020-05-19 RX ORDER — GLUCAGON INJECTION, SOLUTION 0.5 MG/.1ML
1 INJECTION, SOLUTION SUBCUTANEOUS ONCE
Refills: 0 | Status: DISCONTINUED | OUTPATIENT
Start: 2020-05-19 | End: 2020-05-20

## 2020-05-19 RX ORDER — DEXTROSE 50 % IN WATER 50 %
25 SYRINGE (ML) INTRAVENOUS ONCE
Refills: 0 | Status: DISCONTINUED | OUTPATIENT
Start: 2020-05-19 | End: 2020-05-20

## 2020-05-19 RX ORDER — ENOXAPARIN SODIUM 100 MG/ML
50 INJECTION SUBCUTANEOUS
Qty: 1400 | Refills: 0
Start: 2020-05-19 | End: 2020-06-15

## 2020-05-19 RX ORDER — OXYCODONE AND ACETAMINOPHEN 5; 325 MG/1; MG/1
1 TABLET ORAL EVERY 4 HOURS
Refills: 0 | Status: DISCONTINUED | OUTPATIENT
Start: 2020-05-19 | End: 2020-05-20

## 2020-05-19 RX ORDER — DEXTROSE 50 % IN WATER 50 %
15 SYRINGE (ML) INTRAVENOUS ONCE
Refills: 0 | Status: DISCONTINUED | OUTPATIENT
Start: 2020-05-19 | End: 2020-05-20

## 2020-05-19 RX ORDER — DEXTROSE 50 % IN WATER 50 %
12.5 SYRINGE (ML) INTRAVENOUS ONCE
Refills: 0 | Status: DISCONTINUED | OUTPATIENT
Start: 2020-05-19 | End: 2020-05-20

## 2020-05-19 RX ORDER — SODIUM CHLORIDE 9 MG/ML
1000 INJECTION, SOLUTION INTRAVENOUS
Refills: 0 | Status: DISCONTINUED | OUTPATIENT
Start: 2020-05-19 | End: 2020-05-20

## 2020-05-19 RX ORDER — INSULIN LISPRO 100/ML
VIAL (ML) SUBCUTANEOUS
Refills: 0 | Status: DISCONTINUED | OUTPATIENT
Start: 2020-05-19 | End: 2020-05-20

## 2020-05-19 RX ORDER — ATORVASTATIN CALCIUM 80 MG/1
20 TABLET, FILM COATED ORAL AT BEDTIME
Refills: 0 | Status: DISCONTINUED | OUTPATIENT
Start: 2020-05-19 | End: 2020-05-20

## 2020-05-19 RX ORDER — OXYCODONE AND ACETAMINOPHEN 5; 325 MG/1; MG/1
2 TABLET ORAL EVERY 4 HOURS
Refills: 0 | Status: DISCONTINUED | OUTPATIENT
Start: 2020-05-19 | End: 2020-05-20

## 2020-05-19 RX ORDER — ENOXAPARIN SODIUM 100 MG/ML
40 INJECTION SUBCUTANEOUS DAILY
Refills: 0 | Status: DISCONTINUED | OUTPATIENT
Start: 2020-05-19 | End: 2020-05-20

## 2020-05-19 RX ORDER — KETOROLAC TROMETHAMINE 30 MG/ML
30 SYRINGE (ML) INJECTION EVERY 6 HOURS
Refills: 0 | Status: DISCONTINUED | OUTPATIENT
Start: 2020-05-19 | End: 2020-05-20

## 2020-05-19 RX ORDER — ONDANSETRON 8 MG/1
4 TABLET, FILM COATED ORAL EVERY 6 HOURS
Refills: 0 | Status: DISCONTINUED | OUTPATIENT
Start: 2020-05-19 | End: 2020-05-20

## 2020-05-19 RX ORDER — SENNA PLUS 8.6 MG/1
2 TABLET ORAL AT BEDTIME
Refills: 0 | Status: DISCONTINUED | OUTPATIENT
Start: 2020-05-19 | End: 2020-05-20

## 2020-05-19 RX ORDER — SODIUM CHLORIDE 9 MG/ML
1000 INJECTION, SOLUTION INTRAVENOUS
Refills: 0 | Status: DISCONTINUED | OUTPATIENT
Start: 2020-05-19 | End: 2020-05-19

## 2020-05-19 RX ADMIN — Medication 30 MILLIGRAM(S): at 16:52

## 2020-05-19 RX ADMIN — SODIUM CHLORIDE 75 MILLILITER(S): 9 INJECTION, SOLUTION INTRAVENOUS at 16:52

## 2020-05-19 RX ADMIN — Medication 100 GRAM(S): at 13:28

## 2020-05-19 RX ADMIN — SODIUM CHLORIDE 3 MILLILITER(S): 9 INJECTION INTRAMUSCULAR; INTRAVENOUS; SUBCUTANEOUS at 23:37

## 2020-05-19 RX ADMIN — ENOXAPARIN SODIUM 40 MILLIGRAM(S): 100 INJECTION SUBCUTANEOUS at 21:31

## 2020-05-19 RX ADMIN — ATORVASTATIN CALCIUM 20 MILLIGRAM(S): 80 TABLET, FILM COATED ORAL at 21:31

## 2020-05-19 RX ADMIN — Medication 30 MILLIGRAM(S): at 23:34

## 2020-05-19 RX ADMIN — SODIUM CHLORIDE 3 MILLILITER(S): 9 INJECTION INTRAMUSCULAR; INTRAVENOUS; SUBCUTANEOUS at 10:25

## 2020-05-19 NOTE — DISCHARGE NOTE PROVIDER - NSDCMRMEDTOKEN_GEN_ALL_CORE_FT
Crestor 5 mg oral tablet: 1 tab(s) orally once a day  MetFORMIN (Eqv-Fortamet) 500 mg oral tablet, extended release: orally 2 times a day Crestor 5 mg oral tablet: 1 tab(s) orally once a day  enoxaparin 60 mg/0.6 mL injectable solution: 50 milligram(s) subcutaneously once a day x 28 days   MetFORMIN (Eqv-Fortamet) 500 mg oral tablet, extended release: orally 2 times a day  oxycodone-acetaminophen 5 mg-325 mg oral tablet: 1 tab(s) orally every 6 hours, As Needed -for severe pain MDD:4 tabs

## 2020-05-19 NOTE — BRIEF OPERATIVE NOTE - NSICDXBRIEFPREOP_GEN_ALL_CORE_FT
PRE-OP DIAGNOSIS:  Stress incontinence 19-May-2020 16:12:02  Chad Paredes
PRE-OP DIAGNOSIS:  Endometrial cancer 19-May-2020 15:30:08  Tana Alexis

## 2020-05-19 NOTE — PROGRESS NOTE ADULT - PROBLEM SELECTOR PLAN 1
-s/p procedure above  -Percocet and Toradol for pain control  -SCD's and lovenox for DVT prophylaxis  -Zofran for nausea  -IVF @ 125   -Senna for constipation  -Ambulate as tolerated  -Incentive spirometer -s/p procedure above  -Percocet and Toradol for pain control  -SCD's and lovenox for DVT prophylaxis  -Zofran for nausea  -IVF @ 125   -Senna for constipation  -Ambulate as tolerated  -Incentive spirometer  -Arzola to remain until POD 1, followed by active trial of void per GYN

## 2020-05-19 NOTE — PROGRESS NOTE ADULT - SUBJECTIVE AND OBJECTIVE BOX
GYNECOLOGIC ONCOLOGY PROGRESS NOTE    POD#0    PROBLEMS:      Pt seen and examined at bedside.     SUBJECTIVE:    Patient is without complaints.  Pain well-controlled.  Flatus:negative  Denies Nausea, Vomiting or Diarrhea.  Denies shortness of breath, chest pain or dyspnea on exertion.  Tolerating ice chips, willing to eat dinner.     OBJECTIVE:     VITALS:  T(F): 97.9 (05-19-20 @ 18:00), Max: 98.9 (05-19-20 @ 16:19)  HR: 70 (05-19-20 @ 18:00) (61 - 74)  BP: 108/67 (05-19-20 @ 18:00) (108/67 - 143/55)  RR: 20 (05-19-20 @ 18:00) (14 - 20)  SpO2: 95% (05-19-20 @ 18:00) (95% - 100%)    I&O's Summary    19 May 2020 07:01  -  19 May 2020 18:28  --------------------------------------------------------  IN: 0 mL / OUT: 75 mL / NET: -75 mL  Arzola with 275 since OR.      MEDICATIONS  (STANDING):  atorvastatin 20 milliGRAM(s) Oral at bedtime  dextrose 5%. 1000 milliLiter(s) (50 mL/Hr) IV Continuous <Continuous>  dextrose 50% Injectable 12.5 Gram(s) IV Push once  dextrose 50% Injectable 25 Gram(s) IV Push once  dextrose 50% Injectable 25 Gram(s) IV Push once  enoxaparin Injectable 40 milliGRAM(s) SubCutaneous daily  insulin lispro (HumaLOG) corrective regimen sliding scale   SubCutaneous three times a day before meals  ketorolac   Injectable 30 milliGRAM(s) IV Push every 6 hours  lactated ringers. 1000 milliLiter(s) (125 mL/Hr) IV Continuous <Continuous>    MEDICATIONS  (PRN):  dextrose 40% Gel 15 Gram(s) Oral once PRN Blood Glucose LESS THAN 70 milliGRAM(s)/deciliter  glucagon  Injectable 1 milliGRAM(s) IntraMuscular once PRN Glucose LESS THAN 70 milligrams/deciliter  ondansetron Injectable 4 milliGRAM(s) IV Push every 6 hours PRN Postoperative Nausea and/or Vomiting  oxycodone    5 mG/acetaminophen 325 mG 2 Tablet(s) Oral every 4 hours PRN Severe Pain (7 - 10)  oxycodone    5 mG/acetaminophen 325 mG 1 Tablet(s) Oral every 4 hours PRN Moderate Pain (4 - 6)  senna 2 Tablet(s) Oral at bedtime PRN Constipation      Physical Exam:  Constitutional: NAD  Pulmonary: clear to auscultation bilaterally   Cardiovascular: Regular rate and rhythm   Abdomen: soft, non-tender, non-distended.   Extremities: no lower extremity edema or calve tenderness, Colton's sign negative.  Incision: Clean, dry, op sites intact.  Without signs of infection or hernia.      LABS:                RADIOLOGY & ADDITIONAL TESTS:

## 2020-05-19 NOTE — BRIEF OPERATIVE NOTE - OPERATION/FINDINGS
normal bladder
Attending Attestation (For Attendings USE Only)...
normal upper abdomen, normal uterus, cervix, tubes, ovaries. Ovaries adherent to posterior uterus. No sentinel LN on left pelvic identified.

## 2020-05-19 NOTE — DISCHARGE NOTE PROVIDER - NSDCFUADDAPPT_GEN_ALL_CORE_FT
Follow-up with Dr. Marcus in two weeks to review pathology report. Follow-up with Dr. Marcus in two weeks to review pathology report.    Please follow up with Dr. Paredes as scheduled. Follow-up with Dr. Marcus in two weeks to review pathology report.    Please follow up with Dr. Paredes in 2 weeks.

## 2020-05-19 NOTE — DISCHARGE NOTE PROVIDER - NSDCFUSCHEDAPPT_GEN_ALL_CORE_FT
DEV LOPEZ ; 06/04/2020 ; [facility] UR[facility] [address] ; 07/02/2020 ; [facility] UR[facility] [address] DEV LOPEZ ; 06/04/2020 ; [facility] UR[facility] [address] ; 07/02/2020 ; [facility] [address] DEV LOPEZ ; 06/04/2020 ; \[facility]\"" UROGYN [address] ; 07/02/2020 ; \[facility]\"" UROGYN [address]

## 2020-05-19 NOTE — DISCHARGE NOTE PROVIDER - NSDCFUADDINST_GEN_ALL_CORE_FT
May walk and climb stairs as often as you would like, no vigorous activity, do not lift anything greater than 10lbs, nothing per vagina x 6 weeks, do not drive while on pain medication.     Please contact your provider for any pain uncontrolled by medication, excessive bleeding or Fever>100.4  Please take Naprosyn 1 tablet every 12 hours x 4 days, may take percocet as prescribed for breakthrough pain.   Please take Lovenox injections as prescribed.

## 2020-05-19 NOTE — DISCHARGE NOTE PROVIDER - REASON FOR ADMISSION
surgery I am having a hysterectomy robot assisted total laparoscopic hysterectomy, bilateral salpingo-oophorectomy, right sentinel lymph node dissection, left pelvic lymph node dissection, cystoscopy with suburetheral sling placed by Dr. Paredes.

## 2020-05-19 NOTE — BRIEF OPERATIVE NOTE - NSICDXBRIEFPROCEDURE_GEN_ALL_CORE_FT
PROCEDURES:  Sling operation, retropubic, using transvaginal tape, for female stress incontinence, w cystoscopy 19-May-2020 16:11:42  Chad Paredes
PROCEDURES:  Pelvic lymphadenectomy for staging 19-May-2020 15:29:52  Tana Alexis  Bow lymph node mapping 19-May-2020 15:29:33  Tana Alexis  BSO 19-May-2020 15:29:25  Tana Alexis  Robot-assisted laparoscopic hysterectomy with cystoscopy 19-May-2020 15:29:16  Tana Alexis

## 2020-05-19 NOTE — DISCHARGE NOTE PROVIDER - CARE PROVIDER_API CALL
Catherine Marcus)  [facility]  [address] [place] [address]  Phone: [phone]  Fax: [phone]  Follow Up Time:

## 2020-05-19 NOTE — DISCHARGE NOTE PROVIDER - HOSPITAL COURSE
Patient post-operatively had an uncomplicated hospital course. Her pain was well controlled. She is tolerating a regular diet. She is ambulating independently. She was able to void after removal of rivera. Patient with flatus. Labs and Vitals WNL upon discharge. Patient post-operatively had an uncomplicated hospital course. Her pain was well controlled. She is tolerating a regular diet. She is ambulating independently. She was able to void after removal of rivera. Labs and Vitals WNL upon discharge.

## 2020-05-20 ENCOUNTER — TRANSCRIPTION ENCOUNTER (OUTPATIENT)
Age: 60
End: 2020-05-20

## 2020-05-20 VITALS
OXYGEN SATURATION: 96 % | RESPIRATION RATE: 20 BRPM | HEART RATE: 76 BPM | SYSTOLIC BLOOD PRESSURE: 111 MMHG | DIASTOLIC BLOOD PRESSURE: 65 MMHG | TEMPERATURE: 98 F

## 2020-05-20 LAB
ANION GAP SERPL CALC-SCNC: 10 MMOL/L — SIGNIFICANT CHANGE UP (ref 5–17)
BASOPHILS # BLD AUTO: 0.01 K/UL — SIGNIFICANT CHANGE UP (ref 0–0.2)
BASOPHILS NFR BLD AUTO: 0.1 % — SIGNIFICANT CHANGE UP (ref 0–2)
BUN SERPL-MCNC: 12 MG/DL — SIGNIFICANT CHANGE UP (ref 8–20)
CALCIUM SERPL-MCNC: 8.9 MG/DL — SIGNIFICANT CHANGE UP (ref 8.6–10.2)
CHLORIDE SERPL-SCNC: 103 MMOL/L — SIGNIFICANT CHANGE UP (ref 98–107)
CO2 SERPL-SCNC: 27 MMOL/L — SIGNIFICANT CHANGE UP (ref 22–29)
CREAT SERPL-MCNC: 0.91 MG/DL — SIGNIFICANT CHANGE UP (ref 0.5–1.3)
EOSINOPHIL # BLD AUTO: 0 K/UL — SIGNIFICANT CHANGE UP (ref 0–0.5)
EOSINOPHIL NFR BLD AUTO: 0 % — SIGNIFICANT CHANGE UP (ref 0–6)
GLUCOSE BLDC GLUCOMTR-MCNC: 123 MG/DL — HIGH (ref 70–99)
GLUCOSE SERPL-MCNC: 132 MG/DL — HIGH (ref 70–99)
HCT VFR BLD CALC: 36.7 % — SIGNIFICANT CHANGE UP (ref 34.5–45)
HGB BLD-MCNC: 11.5 G/DL — SIGNIFICANT CHANGE UP (ref 11.5–15.5)
IMM GRANULOCYTES NFR BLD AUTO: 0.6 % — SIGNIFICANT CHANGE UP (ref 0–1.5)
LYMPHOCYTES # BLD AUTO: 1.08 K/UL — SIGNIFICANT CHANGE UP (ref 1–3.3)
LYMPHOCYTES # BLD AUTO: 8.8 % — LOW (ref 13–44)
MCHC RBC-ENTMCNC: 29.7 PG — SIGNIFICANT CHANGE UP (ref 27–34)
MCHC RBC-ENTMCNC: 31.3 GM/DL — LOW (ref 32–36)
MCV RBC AUTO: 94.8 FL — SIGNIFICANT CHANGE UP (ref 80–100)
MONOCYTES # BLD AUTO: 0.65 K/UL — SIGNIFICANT CHANGE UP (ref 0–0.9)
MONOCYTES NFR BLD AUTO: 5.3 % — SIGNIFICANT CHANGE UP (ref 2–14)
NEUTROPHILS # BLD AUTO: 10.4 K/UL — HIGH (ref 1.8–7.4)
NEUTROPHILS NFR BLD AUTO: 85.2 % — HIGH (ref 43–77)
PLATELET # BLD AUTO: 230 K/UL — SIGNIFICANT CHANGE UP (ref 150–400)
POTASSIUM SERPL-MCNC: 4.3 MMOL/L — SIGNIFICANT CHANGE UP (ref 3.5–5.3)
POTASSIUM SERPL-SCNC: 4.3 MMOL/L — SIGNIFICANT CHANGE UP (ref 3.5–5.3)
RBC # BLD: 3.87 M/UL — SIGNIFICANT CHANGE UP (ref 3.8–5.2)
RBC # FLD: 12.5 % — SIGNIFICANT CHANGE UP (ref 10.3–14.5)
SODIUM SERPL-SCNC: 140 MMOL/L — SIGNIFICANT CHANGE UP (ref 135–145)
WBC # BLD: 12.21 K/UL — HIGH (ref 3.8–10.5)
WBC # FLD AUTO: 12.21 K/UL — HIGH (ref 3.8–10.5)

## 2020-05-20 PROCEDURE — 86850 RBC ANTIBODY SCREEN: CPT

## 2020-05-20 PROCEDURE — C1771: CPT

## 2020-05-20 PROCEDURE — 88305 TISSUE EXAM BY PATHOLOGIST: CPT

## 2020-05-20 PROCEDURE — S2900: CPT

## 2020-05-20 PROCEDURE — 88341 IMHCHEM/IMCYTCHM EA ADD ANTB: CPT

## 2020-05-20 PROCEDURE — 86901 BLOOD TYPING SEROLOGIC RH(D): CPT

## 2020-05-20 PROCEDURE — 88342 IMHCHEM/IMCYTCHM 1ST ANTB: CPT

## 2020-05-20 PROCEDURE — 82962 GLUCOSE BLOOD TEST: CPT

## 2020-05-20 PROCEDURE — 88309 TISSUE EXAM BY PATHOLOGIST: CPT

## 2020-05-20 PROCEDURE — 36415 COLL VENOUS BLD VENIPUNCTURE: CPT

## 2020-05-20 PROCEDURE — 80048 BASIC METABOLIC PNL TOTAL CA: CPT

## 2020-05-20 PROCEDURE — 88307 TISSUE EXAM BY PATHOLOGIST: CPT

## 2020-05-20 PROCEDURE — 83036 HEMOGLOBIN GLYCOSYLATED A1C: CPT

## 2020-05-20 PROCEDURE — 86900 BLOOD TYPING SEROLOGIC ABO: CPT

## 2020-05-20 PROCEDURE — 85027 COMPLETE CBC AUTOMATED: CPT

## 2020-05-20 RX ORDER — DOCUSATE SODIUM 100 MG
1 CAPSULE ORAL
Qty: 12 | Refills: 0
Start: 2020-05-20 | End: 2020-05-23

## 2020-05-20 RX ORDER — OXYCODONE AND ACETAMINOPHEN 5; 325 MG/1; MG/1
1 TABLET ORAL
Qty: 20 | Refills: 0
Start: 2020-05-20 | End: 2020-05-24

## 2020-05-20 RX ADMIN — SODIUM CHLORIDE 3 MILLILITER(S): 9 INJECTION INTRAMUSCULAR; INTRAVENOUS; SUBCUTANEOUS at 05:44

## 2020-05-20 RX ADMIN — Medication 30 MILLIGRAM(S): at 05:35

## 2020-05-20 NOTE — PROGRESS NOTE ADULT - ASSESSMENT
A/P   58yo POD#1 s/p RA TLH BSO R SLND L pelvic LND cysto, suburethral sling.  - Continue routine post operative care  - CV: No concerns  - Pulm: Incentive spirometer at bedside, encourage ambulation  - GI: tolerating regular diet, demonstrating bowel function  - : rivera catheter removed, passed TOV  - Gyn: Minimal vaginal bleeding  - Heme: Asymptomatic, total , f/u postop CBC  - ID: no concerns  - DVT: Lovenox  - Pain: well-controlled  - Neuro/Psych: No concerns  - Dispo: Home today

## 2020-05-20 NOTE — DISCHARGE NOTE NURSING/CASE MANAGEMENT/SOCIAL WORK - NSDCFUADDAPPT_GEN_ALL_CORE_FT
Follow-up with Dr. Marcus in two weeks to review pathology report.    Please follow up with Dr. Paredes as scheduled.

## 2020-05-20 NOTE — PROGRESS NOTE ADULT - SUBJECTIVE AND OBJECTIVE BOX
60yo POD#1 s/p RA TLH BSO R SLND L pelvic LND cysto, suburethral sling.    S: Patient was seen and examined at bedside--doing well with no acute complaints  Pain is controlled.   Tolerating regular diet, denies N/V.   Arzola has been removed. Passed her TOV.  +flatus/-BM    O:   T(C): 36.5 (05-20-20 @ 04:15), Max: 37.2 (05-19-20 @ 16:19)  HR: 73 (05-20-20 @ 04:15) (61 - 87)  BP: 102/56 (05-20-20 @ 04:15) (102/56 - 143/55)  RR: 20 (05-20-20 @ 04:15) (14 - 20)  SpO2: 97% (05-20-20 @ 04:15) (95% - 100%)    CV: Regular rate and rhythm  Lungs: CTAB  Abdomen: soft, nontender, +BS   Incision: closed with dermabond, and is clean, dry and intact  Ext: no edema, erythema or pain 60yo POD#1 s/p RA TLH BSO R SLND L pelvic LND cysto, suburethral sling.    S: Patient was seen and examined at bedside--doing well with no acute complaints  Pain is controlled.   Tolerating regular diet, denies N/V.   Arzola has been removed. Passed her TOV.  +flatus/-BM    O:   T(C): 36.5 (05-20-20 @ 04:15), Max: 37.2 (05-19-20 @ 16:19)  HR: 73 (05-20-20 @ 04:15) (61 - 87)  BP: 102/56 (05-20-20 @ 04:15) (102/56 - 143/55)  RR: 20 (05-20-20 @ 04:15) (14 - 20)  SpO2: 97% (05-20-20 @ 04:15) (95% - 100%)    CV: Regular rate and rhythm  Lungs: CTAB  Abdomen: soft, nontender, +BS   Incision: closed with dermabond, and is clean, dry and intact  VE no active bleeding  Ext: no edema, erythema or pain

## 2020-05-20 NOTE — PROGRESS NOTE ADULT - ASSESSMENT
58 y/o POD#1 s/p robotic assisted total laparoscopic hysterectomy with bilateral salpingo-oophorectomy and pelvic lymph node dissection 2/2 endometrial cancer.     Neuro: pain well controlled on PO meds   CV: BP and HR WNL, no upstanding concerns   Pulm: CTAB, no outstanding concerns   GI/Nutrition: tolerating regular diet   /Renal: voiding spontaneously   Heme: AM labs pending   Proph: Lovenox   Dispo: anticipated discharge today pending labs 58 y/o POD#1 s/p robotic assisted total laparoscopic hysterectomy with bilateral salpingo-oophorectomy and pelvic lymph node dissection 2/2 endometrial cancer.     Neuro: pain well controlled on PO meds   CV: BP and HR WNL, no upstanding concerns   Pulm: CTAB, no outstanding concerns   GI/Nutrition: tolerating regular diet   /Renal: voiding spontaneously, successful trial of void this AM per UROGYN   Heme: AM labs pending   Proph: Lovenox   Dispo: anticipated discharge today pending labs

## 2020-05-20 NOTE — PROGRESS NOTE ADULT - SUBJECTIVE AND OBJECTIVE BOX
GYNECOLOGIC ONCOLOGY PROGRESS NOTE    POD#1    PROBLEMS:  Diabetes mellitus  High cholesterol  Endometrial cancer    Pt seen and examined at bedside.     SUBJECTIVE:    Patient is without complaints, feeling well.   Pain well-controlled on PCA.   Tolerating diet.  Flatus: denies  Denies Nausea, Vomiting or Diarrhea.  Denies shortness of breath, chest pain or dyspnea on exertion.  Denies fevers, chills, malaise, fatigue, and myalgia.     OBJECTIVE:     VITALS:  T(F): 97.7 (05-20-20 @ 04:15), Max: 98.9 (05-19-20 @ 16:19)  HR: 73 (05-20-20 @ 04:15) (61 - 87)  BP: 102/56 (05-20-20 @ 04:15) (102/56 - 143/55)  RR: 20 (05-20-20 @ 04:15) (14 - 20)  SpO2: 97% (05-20-20 @ 04:15) (95% - 100%)    I&O's Summary    19 May 2020 07:01  -  20 May 2020 06:12  --------------------------------------------------------  IN: 800 mL / OUT: 2075 mL / NET: -1275 mL    Physical Exam:  Constitutional: NAD  Pulmonary: clear to auscultation bilaterally   Cardiovascular: Regular rate and rhythm   Abdomen: soft, appropriately tender, moderately distended, normal bowel sounds  Extremities: no lower extremity edema or calve tenderness, Colton's sign negative.  Incision: Clean, dry, intact.  Without signs of infection or hernia.    LABS: AM labs pending

## 2020-05-20 NOTE — PROGRESS NOTE ADULT - ATTENDING COMMENTS
Patient seen and examined. Agree with PGY 1 note above. 300 in 200 out, passed TOV. Labs pending. Disposition as per primary team. Cleared for discharge form urogyn view. Follow up in office in 2 weeks.

## 2020-05-20 NOTE — DISCHARGE NOTE NURSING/CASE MANAGEMENT/SOCIAL WORK - PATIENT PORTAL LINK FT
You can access the FollowMyHealth Patient Portal offered by [facility] by registering at the following website: [email or web address]. By joining [employer]’s FollowMyHealth portal, you will also be able to view your health information using other applications (apps) compatible with our system.

## 2020-05-21 PROBLEM — M54.30 SCIATICA, UNSPECIFIED SIDE: Chronic | Status: ACTIVE | Noted: 2020-05-12

## 2020-05-21 PROBLEM — C55 MALIGNANT NEOPLASM OF UTERUS, PART UNSPECIFIED: Chronic | Status: ACTIVE | Noted: 2020-05-12

## 2020-05-21 PROBLEM — E78.00 PURE HYPERCHOLESTEROLEMIA, UNSPECIFIED: Chronic | Status: ACTIVE | Noted: 2020-05-12

## 2020-05-21 PROBLEM — E11.9 TYPE 2 DIABETES MELLITUS WITHOUT COMPLICATIONS: Chronic | Status: ACTIVE | Noted: 2020-05-12

## 2020-05-21 PROBLEM — R55 SYNCOPE AND COLLAPSE: Chronic | Status: ACTIVE | Noted: 2020-05-12

## 2020-05-21 PROBLEM — I82.409 ACUTE EMBOLISM AND THROMBOSIS OF UNSPECIFIED DEEP VEINS OF UNSPECIFIED LOWER EXTREMITY: Chronic | Status: ACTIVE | Noted: 2020-05-12

## 2020-05-26 LAB — SURGICAL PATHOLOGY STUDY: SIGNIFICANT CHANGE UP

## 2020-06-03 ENCOUNTER — APPOINTMENT (OUTPATIENT)
Dept: GYNECOLOGIC ONCOLOGY | Facility: CLINIC | Age: 60
End: 2020-06-03
Payer: COMMERCIAL

## 2020-06-03 PROCEDURE — 99024 POSTOP FOLLOW-UP VISIT: CPT

## 2020-06-03 NOTE — DISCUSSION/SUMMARY
[Clean] : was clean [Dry] : was dry [Intact] : was intact [Erythema] : was not erythematous [Ecchymosis] : was not ecchymotic [Seroma] : had no seroma [None] : had no drainage [Normal Skin] : normal appearance [Firm] : soft [Tender] : nontender [Abnormal Bowel Sounds] : normal bowel sounds [Rebound] : no rebound tenderness [Guarding] : no guarding [Incisional Hernia] : no incisional hernia [Mass] : no palpable mass [Excellent Pain Control] : has excellent pain control [Doing Well] : is doing well [No Sign of Infection] : is showing no signs of infection [0] : 0

## 2020-06-03 NOTE — ASSESSMENT
[FreeTextEntry1] : Pt is a 58 yo with Stage Ia endometroid adenocarcinoma s/p TRH, BSO, SLND, superficial myometrial invasion, no LVI, negative lymph nodes, Grade 1. No risk factors and no recommendation for adjuvant treatment. She did have lower uterine segment involvement and discussed going for discussion with radiation oncology but patient declined.

## 2020-06-03 NOTE — REASON FOR VISIT
[Post Op] : post op visit [de-identified] : 5/19/20 [de-identified] : TRH, BSO, SLND [de-identified] : Pt presents for first post-op check. She reports having a great experience and doing very well overall. She does report her stomach got tense about 2 weeks after surgery. No swelling in the legs. No nausea/vomiting.

## 2020-06-04 ENCOUNTER — APPOINTMENT (OUTPATIENT)
Dept: UROGYNECOLOGY | Facility: CLINIC | Age: 60
End: 2020-06-04
Payer: COMMERCIAL

## 2020-06-04 ENCOUNTER — RESULT CHARGE (OUTPATIENT)
Age: 60
End: 2020-06-04

## 2020-06-04 LAB
BILIRUB UR QL STRIP: NEGATIVE
GLUCOSE UR-MCNC: NEGATIVE
HCG UR QL: 0.2 EU/DL
HGB UR QL STRIP.AUTO: NORMAL
KETONES UR-MCNC: NEGATIVE
LEUKOCYTE ESTERASE UR QL STRIP: NORMAL
NITRITE UR QL STRIP: NEGATIVE
PH UR STRIP: 5.5
PROT UR STRIP-MCNC: NEGATIVE
SP GR UR STRIP: 1.02

## 2020-06-04 PROCEDURE — 81003 URINALYSIS AUTO W/O SCOPE: CPT | Mod: NC,QW

## 2020-06-04 PROCEDURE — 99024 POSTOP FOLLOW-UP VISIT: CPT

## 2020-06-04 NOTE — OBJECTIVE
[Post Void Residual ____ ml] : Post Void Residual was [unfilled] ml [Soft and Nontender] : soft and nontender [Clean, Dry, Intact] : Clean, Dry, Intact

## 2020-06-04 NOTE — ASSESSMENT
[FreeTextEntry1] : 58 y/o post vaginal sling and cystoscopy for urinary incontinence. This was a combination case with Dr. Marcus pt seen on Lise 3,2020. pt doing well to return in 4 weeks for vaginal exam with Dr. Paredes. pt understands and agree , will call if more questions arise.

## 2020-06-04 NOTE — SUBJECTIVE
[FreeTextEntry1] : well no complaints  [FreeTextEntry8] : no changes  [FreeTextEntry7] : denied [FreeTextEntry6] : good  [FreeTextEntry5] : slow stream but better than prior , frequency little less , nocturia x 4 , no hematuia, dysuria , no leakage with sneeze and cough  [FreeTextEntry4] : + bm  [FreeTextEntry3] : good  [FreeTextEntry2] : same as prior to surgery

## 2020-06-04 NOTE — DISCUSSION/SUMMARY
[Post-Op instructions given. Pt/family verbalizes understanding] : post-operative instructions were provided to the patient/family who verbalize understanding

## 2020-07-01 ENCOUNTER — APPOINTMENT (OUTPATIENT)
Dept: GYNECOLOGIC ONCOLOGY | Facility: CLINIC | Age: 60
End: 2020-07-01
Payer: COMMERCIAL

## 2020-07-01 PROCEDURE — 99024 POSTOP FOLLOW-UP VISIT: CPT

## 2020-07-01 NOTE — REASON FOR VISIT
[Post Op] : post op visit [de-identified] : TRH, BSO, SLND [de-identified] : Pt presents for second post-op check. 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 No swelling in the legs. No nausea/vomiting. [de-identified] : 5/19/20

## 2020-07-01 NOTE — ASSESSMENT
[FreeTextEntry1] : Pt is a 58 yo with Stage Ia endometroid adenocarcinoma s/p TRH, BSO, SLND, superficial myometrial invasion, no LVI, negative lymph nodes, Grade 1. No risk factors and no recommendation for adjuvant treatment. She did have lower uterine segment involvement and discussed going for discussion with radiation oncology but patient declined. Recovered well.

## 2020-07-01 NOTE — DISCUSSION/SUMMARY
[Clean] : was clean [Dry] : was dry [Intact] : was intact [Erythema] : was not erythematous [Seroma] : had no seroma [Ecchymosis] : was not ecchymotic [None] : had no drainage [Normal Skin] : normal appearance [Firm] : soft [Tender] : nontender [Abnormal Bowel Sounds] : normal bowel sounds [Rebound] : no rebound tenderness [Incisional Hernia] : no incisional hernia [Guarding] : no guarding [External Genitalia Abnormal] : normal external genitalia [Mass] : no palpable mass [Vaginal Exam Abnormal] : normal vaginal exam [No Sign of Infection] : is showing no signs of infection [Excellent Pain Control] : has excellent pain control [Doing Well] : is doing well [de-identified] : well healed vaginal cuff [0] : 0

## 2020-07-02 ENCOUNTER — APPOINTMENT (OUTPATIENT)
Dept: UROGYNECOLOGY | Facility: CLINIC | Age: 60
End: 2020-07-02

## 2020-07-02 ENCOUNTER — APPOINTMENT (OUTPATIENT)
Dept: UROGYNECOLOGY | Facility: CLINIC | Age: 60
End: 2020-07-02
Payer: COMMERCIAL

## 2020-07-02 VITALS — SYSTOLIC BLOOD PRESSURE: 128 MMHG | DIASTOLIC BLOOD PRESSURE: 82 MMHG

## 2020-07-02 LAB
BILIRUB UR QL STRIP: NEGATIVE
CLARITY UR: CLEAR
COLLECTION METHOD: NORMAL
GLUCOSE UR-MCNC: NEGATIVE
HCG UR QL: 0.2 EU/DL
HGB UR QL STRIP.AUTO: NEGATIVE
KETONES UR-MCNC: NEGATIVE
LEUKOCYTE ESTERASE UR QL STRIP: NORMAL
NITRITE UR QL STRIP: NEGATIVE
PH UR STRIP: 5
PROT UR STRIP-MCNC: NEGATIVE
SP GR UR STRIP: >=1.03

## 2020-07-02 PROCEDURE — 99024 POSTOP FOLLOW-UP VISIT: CPT

## 2020-07-02 PROCEDURE — 81003 URINALYSIS AUTO W/O SCOPE: CPT | Mod: NC,QW

## 2020-07-02 NOTE — DISCUSSION/SUMMARY
[FreeTextEntry1] : ABout 6 weeks s/p sling doing well. Katelyn is very happy with the outcome of the surgery. I cleared her to gradually return to regular activity. I asked her to to followup  in 4 months.

## 2020-07-02 NOTE — PHYSICAL EXAM
[Chaperone Present] : A chaperone was present in the examining room during all aspects of the physical examination [Normal Appearance] : general appearance was normal [Absent] : absent [Normal] : no abnormalities [FreeTextEntry4] : no exposure, sling nontender

## 2020-07-02 NOTE — HISTORY OF PRESENT ILLNESS
[FreeTextEntry1] : About 6 weeks s/p hysterectomy by GYN ONC and sling by me. Lost father to COVID during her recovery. No adjuvant treatment for her endometrial cancer. Had no pain post op. In the beginning urinary stream was slower but it is improving. She still sometimes has to do maneuver to get the stream initiated. Not using pads anymore as she has not leaking with activity.

## 2020-12-02 ENCOUNTER — APPOINTMENT (OUTPATIENT)
Dept: GYNECOLOGIC ONCOLOGY | Facility: CLINIC | Age: 60
End: 2020-12-02
Payer: COMMERCIAL

## 2020-12-02 VITALS
WEIGHT: 270 LBS | DIASTOLIC BLOOD PRESSURE: 81 MMHG | HEART RATE: 67 BPM | SYSTOLIC BLOOD PRESSURE: 145 MMHG | OXYGEN SATURATION: 99 % | HEIGHT: 64 IN | RESPIRATION RATE: 16 BRPM | BODY MASS INDEX: 46.1 KG/M2

## 2020-12-02 PROCEDURE — 99214 OFFICE O/P EST MOD 30 MIN: CPT

## 2020-12-02 PROCEDURE — 99072 ADDL SUPL MATRL&STAF TM PHE: CPT

## 2020-12-02 NOTE — HISTORY OF PRESENT ILLNESS
[FreeTextEntry1] : Pt is a 61 yo with Stage Ia endometroid adenocarcinoma s/p TRH, BSO, SLND, superficial myometrial invasion, no LVI, negative lymph nodes, Grade 1. No risk factors and no recommendation for adjuvant treatment. \par \par She has no new complaints, not sexually active due to opposite schedules with . No vaginal bleeding, no changes in medical/surgical history.

## 2020-12-02 NOTE — ASSESSMENT
[FreeTextEntry1] : Pt is a 61 yo with Stage Ia endometroid adenocarcinoma s/p TRH, BSO, SLND, superficial myometrial invasion, no LVI, negative lymph nodes, Grade 1. No evidence of disease.

## 2020-12-02 NOTE — PHYSICAL EXAM
[Absent] : Adnexa(ae): Absent [Normal] : Anus and perineum: Normal sphincter tone, no masses, no prolapse.

## 2020-12-09 ENCOUNTER — APPOINTMENT (OUTPATIENT)
Dept: RHEUMATOLOGY | Facility: CLINIC | Age: 60
End: 2020-12-09
Payer: COMMERCIAL

## 2020-12-09 VITALS
HEART RATE: 82 BPM | WEIGHT: 260 LBS | TEMPERATURE: 97.9 F | HEIGHT: 64 IN | BODY MASS INDEX: 44.39 KG/M2 | SYSTOLIC BLOOD PRESSURE: 120 MMHG | DIASTOLIC BLOOD PRESSURE: 70 MMHG | OXYGEN SATURATION: 98 %

## 2020-12-09 DIAGNOSIS — C54.1 MALIGNANT NEOPLASM OF ENDOMETRIUM: ICD-10-CM

## 2020-12-09 PROCEDURE — 99072 ADDL SUPL MATRL&STAF TM PHE: CPT

## 2020-12-09 PROCEDURE — 99204 OFFICE O/P NEW MOD 45 MIN: CPT

## 2020-12-09 RX ORDER — MISOPROSTOL 200 UG/1
200 TABLET ORAL
Qty: 2 | Refills: 0 | Status: DISCONTINUED | COMMUNITY
Start: 2020-01-08 | End: 2020-12-09

## 2020-12-10 PROBLEM — C54.1 ENDOMETRIAL CANCER: Status: ACTIVE | Noted: 2020-03-04

## 2020-12-10 NOTE — HISTORY OF PRESENT ILLNESS
[FreeTextEntry1] : 60 year old  female seeking a new rheumatologist today. She was previously under the care of Dr. Infante. She states that she was told that she has rheumatoid arthritis and osteoarthritis. She has not seen him in several years. She does not recall the name of the medications she was treated with, but when I mentioned the DMARDS and the usual RA medications they do not sound familiar. She denies psoriasis.\par \par She was recently seen by her primary care physician and was noted to have a high sedimentation rate, she was told to followup with rheumatology. She has a recent diagnosis of uterine cancer as well now status post surgery and doing well.\par \par She is denies prolonged morning stiffness. She states for about 5-10 minutes. She has joint pains, she rates them as mild to moderate depending on that date and activity level and the weather. Most of the pain is in her knees hips and hands. She admits to dry skin but denies psoriasis. She denies colitis. She denies poor sleep. She thinks she has sleep apnea but is not sure.\par \par There is no family history of autoimmunity.\par \par She has an average appetite and denies weight loss. She denies fevers or chills or night sweats.

## 2020-12-10 NOTE — CONSULT LETTER
[Dear  ___] : Dear  [unfilled], [Consult Letter:] : I had the pleasure of evaluating your patient, [unfilled]. [Please see my note below.] : Please see my note below. [Consult Closing:] : Thank you very much for allowing me to participate in the care of this patient.  If you have any questions, please do not hesitate to contact me. [Sincerely,] : Sincerely, [FreeTextEntry3] : Josesito Pastrana D.O\par

## 2020-12-10 NOTE — PHYSICAL EXAM
[General Appearance - Alert] : alert [General Appearance - Well Nourished] : well nourished [General Appearance - Well Developed] : well developed [Sclera] : the sclera and conjunctiva were normal [Oropharynx] : the oropharynx was normal [Neck Appearance] : the appearance of the neck was normal [Respiration, Rhythm And Depth] : normal respiratory rhythm and effort [Auscultation Breath Sounds / Voice Sounds] : lungs were clear to auscultation bilaterally [Heart Sounds] : normal S1 and S2 [Full Pulse] : the pedal pulses are present [Edema] : there was no peripheral edema [Abdomen Tenderness] : non-tender [Cervical Lymph Nodes Enlarged Anterior Bilaterally] : anterior cervical [Supraclavicular Lymph Nodes Enlarged Bilaterally] : supraclavicular [No Spinal Tenderness] : no spinal tenderness [Abnormal Walk] : normal gait [Nail Clubbing] : no clubbing  or cyanosis of the fingernails [Musculoskeletal - Swelling] : no joint swelling seen [Motor Tone] : muscle strength and tone were normal [] : no rash [Deep Tendon Reflexes (DTR)] : deep tendon reflexes were 2+ and symmetric [Motor Exam] : the motor exam was normal [Oriented To Time, Place, And Person] : oriented to person, place, and time [Impaired Insight] : insight and judgment were intact [Affect] : the affect was normal [FreeTextEntry1] : dry skin

## 2020-12-10 NOTE — ASSESSMENT
[FreeTextEntry1] : 60-year-old  female the current diagnosis of pre-diabetes and hypercholesterolemia, recent diagnosis of endometrial cancer status post surgery presents as a second opinion today. She was previously seen by rheumatology diagnosed with osteoarthritis and rheumatoid arthritis as per the patient. This was several years ago. She does not recall the names of medications but the usual rheumatoid arthritis medications do not sound familiar to her. She recently went to see her primary care physician and was noted to have a high sedimentation rate. Unfortunately, these labs are not available for review at this time.\par \par Her current review of systems is positive for joint pains and fatigue, but joint pain skin very in intensity from mild to moderate. Her review of systems is otherwise negative. Today, on my exam she has very dry skin without any pitting noted on her nail pits. She has full range of motion of her joints without any evidence of inflammatory arthritis. She does not have any tender points. She has features of osteoarthritis.\par \par Based on my current clinical exam and inflammatory arthropathy seems less likely. Osteoarthritis may be the most likely diagnosis. She does not have tender points which makes fibromyalgia less likely.\par \par Plan-\par -I have requested a copy of her recent labs from her primary care physician\par -depending on these labs I will obtain additional labs\par -For now I did suggest physical therapy but she defers\par -Recommend weight loss as tolerated\par -She defers use of oral medication\par -trial of topical  NSAID gel as tolerated for the knee pain\par -followup once I review the labs\par -To call if symptoms worsen

## 2020-12-11 ENCOUNTER — APPOINTMENT (OUTPATIENT)
Dept: UROGYNECOLOGY | Facility: CLINIC | Age: 60
End: 2020-12-11
Payer: COMMERCIAL

## 2020-12-11 VITALS — SYSTOLIC BLOOD PRESSURE: 132 MMHG | DIASTOLIC BLOOD PRESSURE: 72 MMHG

## 2020-12-11 DIAGNOSIS — R39.198 OTHER DIFFICULTIES WITH MICTURITION: ICD-10-CM

## 2020-12-11 DIAGNOSIS — Z98.890 OTHER SPECIFIED POSTPROCEDURAL STATES: ICD-10-CM

## 2020-12-11 LAB
BILIRUB UR QL STRIP: NEGATIVE
CLARITY UR: CLEAR
COLLECTION METHOD: NORMAL
GLUCOSE UR-MCNC: NEGATIVE
HCG UR QL: 0.2 EU/DL
HGB UR QL STRIP.AUTO: NEGATIVE
KETONES UR-MCNC: NEGATIVE
LEUKOCYTE ESTERASE UR QL STRIP: NEGATIVE
NITRITE UR QL STRIP: NEGATIVE
PH UR STRIP: 5
PROT UR STRIP-MCNC: NEGATIVE
SP GR UR STRIP: >=1.03

## 2020-12-11 PROCEDURE — 51798 US URINE CAPACITY MEASURE: CPT

## 2020-12-11 PROCEDURE — 99072 ADDL SUPL MATRL&STAF TM PHE: CPT

## 2020-12-11 PROCEDURE — 99213 OFFICE O/P EST LOW 20 MIN: CPT | Mod: 25

## 2020-12-11 PROCEDURE — 81003 URINALYSIS AUTO W/O SCOPE: CPT | Mod: NC,QW

## 2020-12-11 NOTE — HISTORY OF PRESENT ILLNESS
[FreeTextEntry1] : About 6 months s/p sling by me and hysterectomy by [facility]YN [facility]. She reports that she is not leaking and is happy about that. She does feel that starting in October she started to develop some issues with emptying. She sometimes tries to push to empty and her urinary stream will stop. She will have to lean forward to fully empty.

## 2020-12-11 NOTE — DISCUSSION/SUMMARY
[FreeTextEntry1] : Doing well. We discussed a referral for PT to work on her pelvic floor muscles but she declines at this time. She is happy that she is not leaking. I stressed that she should not valsalva to void with the sling in place. Follwoup as needed.

## 2021-01-26 ENCOUNTER — LABORATORY RESULT (OUTPATIENT)
Age: 61
End: 2021-01-26

## 2021-01-26 ENCOUNTER — APPOINTMENT (OUTPATIENT)
Dept: DERMATOLOGY | Facility: CLINIC | Age: 61
End: 2021-01-26
Payer: COMMERCIAL

## 2021-01-26 ENCOUNTER — NON-APPOINTMENT (OUTPATIENT)
Age: 61
End: 2021-01-26

## 2021-01-26 LAB
ALBUMIN MFR SERPL ELPH: 57.5 %
ALBUMIN SERPL ELPH-MCNC: 4.3 G/DL
ALBUMIN SERPL-MCNC: 4.2 G/DL
ALBUMIN/GLOB SERPL: 1.4 RATIO
ALP BLD-CCNC: 93 U/L
ALPHA1 GLOB MFR SERPL ELPH: 3.6 %
ALPHA1 GLOB SERPL ELPH-MCNC: 0.3 G/DL
ALPHA2 GLOB MFR SERPL ELPH: 8.2 %
ALPHA2 GLOB SERPL ELPH-MCNC: 0.6 G/DL
ALT SERPL-CCNC: 33 U/L
ANION GAP SERPL CALC-SCNC: 12 MMOL/L
AST SERPL-CCNC: 32 U/L
B-GLOBULIN MFR SERPL ELPH: 12.8 %
B-GLOBULIN SERPL ELPH-MCNC: 0.9 G/DL
BASOPHILS # BLD AUTO: 0.03 K/UL
BASOPHILS NFR BLD AUTO: 0.5 %
BILIRUB SERPL-MCNC: 0.4 MG/DL
BUN SERPL-MCNC: 12 MG/DL
CALCIUM SERPL-MCNC: 10 MG/DL
CCP AB SER IA-ACNC: <8 UNITS
CENTROMERE IGG SER-ACNC: <0.2 CD:130001892
CHLORIDE SERPL-SCNC: 102 MMOL/L
CK SERPL-CCNC: 196 U/L
CO2 SERPL-SCNC: 25 MMOL/L
CREAT SERPL-MCNC: 0.94 MG/DL
CREAT SPEC-SCNC: 89 MG/DL
CREAT/PROT UR: 0.1 RATIO
CRP SERPL-MCNC: 1.16 MG/DL
ENA RNP AB SER IA-ACNC: 0.3 AL
ENA SM AB SER IA-ACNC: <0.2 AL
ENA SS-A AB SER IA-ACNC: <0.2 AL
ENA SS-B AB SER IA-ACNC: <0.2 AL
EOSINOPHIL # BLD AUTO: 0.11 K/UL
EOSINOPHIL NFR BLD AUTO: 1.9 %
ERYTHROCYTE [SEDIMENTATION RATE] IN BLOOD BY WESTERGREN METHOD: 40 MM/HR
GAMMA GLOB FLD ELPH-MCNC: 1.3 G/DL
GAMMA GLOB MFR SERPL ELPH: 17.9 %
GLUCOSE SERPL-MCNC: 112 MG/DL
HCT VFR BLD CALC: 39.2 %
HGB BLD-MCNC: 12.2 G/DL
IMM GRANULOCYTES NFR BLD AUTO: 0.2 %
INTERPRETATION SERPL IEP-IMP: NORMAL
LYMPHOCYTES # BLD AUTO: 1.98 K/UL
LYMPHOCYTES NFR BLD AUTO: 34.5 %
MAN DIFF?: NORMAL
MCHC RBC-ENTMCNC: 29.1 PG
MCHC RBC-ENTMCNC: 31.1 GM/DL
MCV RBC AUTO: 93.6 FL
MONOCYTES # BLD AUTO: 0.41 K/UL
MONOCYTES NFR BLD AUTO: 7.1 %
NEUTROPHILS # BLD AUTO: 3.2 K/UL
NEUTROPHILS NFR BLD AUTO: 55.8 %
PLATELET # BLD AUTO: 249 K/UL
POTASSIUM SERPL-SCNC: 4.9 MMOL/L
PROT SERPL-MCNC: 7 G/DL
PROT SERPL-MCNC: 7.3 G/DL
PROT SERPL-MCNC: 7.3 G/DL
PROT UR-MCNC: 5 MG/DL
RBC # BLD: 4.19 M/UL
RBC # FLD: 12.7 %
RF+CCP IGG SER-IMP: NEGATIVE
RHEUMATOID FACT SER QL: <10 IU/ML
SODIUM SERPL-SCNC: 139 MMOL/L
WBC # FLD AUTO: 5.74 K/UL

## 2021-01-26 PROCEDURE — 11102 TANGNTL BX SKIN SINGLE LES: CPT

## 2021-01-26 PROCEDURE — 99203 OFFICE O/P NEW LOW 30 MIN: CPT | Mod: 25

## 2021-01-26 PROCEDURE — 99072 ADDL SUPL MATRL&STAF TM PHE: CPT

## 2021-01-26 RX ORDER — PNV NO.95/FERROUS FUM/FOLIC AC 28MG-0.8MG
TABLET ORAL
Refills: 0 | Status: ACTIVE | COMMUNITY

## 2021-01-26 NOTE — PHYSICAL EXAM
[Full Body Skin Exam Performed] : performed [FreeTextEntry3] : Skin examination performed of the face, neck, trunk, arms, legs; \par The patient is well, alert and oriented, pleasant and cooperative.\par Eyelids, conjunctivae, oral mucosa, digits and nails all normal.  \par No cervical adenopathy.\par \par Normal findings include:\par \par Seborrheic keratoses\par Angiomas\par + lentigines and solar damage are present in sun exposed areas; face\par \par scaling pearly erythematous patch R upper back; \par \par

## 2021-01-26 NOTE — ASSESSMENT
[FreeTextEntry1] : The patient was instructed to check portal and/or call the office in one week for biopsy results.\par r/o superficial BCC\par Plan to treat by D&C if the biopsy is positive. \par Continue regular exams;

## 2021-01-26 NOTE — HISTORY OF PRESENT ILLNESS
[de-identified] : Pt. presents for skin check;\par c/o few spots of concern;  lesions on back noted by PMD, also spots on face\par Severity:  mild  \par Modifying factors:  none\par Associated symptoms:  none\par Context:  no association with activity

## 2021-02-05 LAB
ALBUMIN MFR SERPL ELPH: 54 %
ALBUMIN SERPL-MCNC: 3.8 G/DL
ALBUMIN/GLOB SERPL: 1.2 RATIO
ALPHA1 GLOB MFR SERPL ELPH: 3.8 %
ALPHA1 GLOB SERPL ELPH-MCNC: 0.3 G/DL
ALPHA2 GLOB MFR SERPL ELPH: 9 %
ALPHA2 GLOB SERPL ELPH-MCNC: 0.6 G/DL
ANA SER IF-ACNC: NEGATIVE
B-GLOBULIN MFR SERPL ELPH: 14 %
B-GLOBULIN SERPL ELPH-MCNC: 1 G/DL
DEPRECATED KAPPA LC FREE/LAMBDA SER: 1.36 RATIO
DSDNA AB SER-ACNC: 130 IU/ML
GAMMA GLOB FLD ELPH-MCNC: 1.3 G/DL
GAMMA GLOB MFR SERPL ELPH: 19.2 %
HISTONE AB SER QL: 1.2 UNITS
IGA SER QL IEP: 326 MG/DL
IGG SER QL IEP: 1329 MG/DL
IGM SER QL IEP: 82 MG/DL
INTERPRETATION SERPL IEP-IMP: NORMAL
KAPPA LC CSF-MCNC: 1.8 MG/DL
KAPPA LC SERPL-MCNC: 2.44 MG/DL
M PROTEIN SPEC IFE-MCNC: NORMAL
PROT SERPL-MCNC: 7 G/DL
PROT SERPL-MCNC: 7 G/DL

## 2021-02-09 LAB — CORE LAB BIOPSY: NORMAL

## 2021-02-17 ENCOUNTER — APPOINTMENT (OUTPATIENT)
Dept: OBGYN | Facility: CLINIC | Age: 61
End: 2021-02-17
Payer: COMMERCIAL

## 2021-02-17 VITALS
SYSTOLIC BLOOD PRESSURE: 130 MMHG | WEIGHT: 260 LBS | HEIGHT: 64 IN | OXYGEN SATURATION: 98 % | BODY MASS INDEX: 44.39 KG/M2 | HEART RATE: 80 BPM | RESPIRATION RATE: 18 BRPM | DIASTOLIC BLOOD PRESSURE: 74 MMHG

## 2021-02-17 DIAGNOSIS — E66.9 OBESITY, UNSPECIFIED: ICD-10-CM

## 2021-02-17 DIAGNOSIS — Z86.39 PERSONAL HISTORY OF OTHER ENDOCRINE, NUTRITIONAL AND METABOLIC DISEASE: ICD-10-CM

## 2021-02-17 DIAGNOSIS — Z01.419 ENCOUNTER FOR GYNECOLOGICAL EXAMINATION (GENERAL) (ROUTINE) W/OUT ABNORMAL FINDINGS: ICD-10-CM

## 2021-02-17 PROCEDURE — 99396 PREV VISIT EST AGE 40-64: CPT

## 2021-02-17 PROCEDURE — 82270 OCCULT BLOOD FECES: CPT

## 2021-02-17 PROCEDURE — 99072 ADDL SUPL MATRL&STAF TM PHE: CPT

## 2021-02-17 NOTE — HISTORY OF PRESENT ILLNESS
[FreeTextEntry1] : Patient is a 52-year-old female who presents for routine annual gynecologic examination. Patient with a history of endometrial cancer treated with KELSEY/BSO. Patient with complaints of pelvic cramping. Discussed this issue encourage patient had GI evaluation and as the cause of the cramping is likely GI. Patient denies any abnormal vaginal discharge or bleeding. Patient is urinating and moving her bowels normally

## 2021-02-17 NOTE — PHYSICAL EXAM
[Appropriately responsive] : appropriately responsive [Alert] : alert [No Acute Distress] : no acute distress [No Lymphadenopathy] : no lymphadenopathy [No Murmurs] : no murmurs [Regular Rate Rhythm] : regular rate rhythm [Clear to Auscultation B/L] : clear to auscultation bilaterally [Soft] : soft [Non-tender] : non-tender [Non-distended] : non-distended [No HSM] : No HSM [No Lesions] : no lesions [No Mass] : no mass [Oriented x3] : oriented x3 [Examination Of The Breasts] : a normal appearance [No Masses] : no breast masses were palpable [Vulvar Atrophy] : vulvar atrophy [Labia Majora] : normal [Labia Minora] : normal [Normal] : normal [Atrophy] : atrophy [Absent] : absent [Uterine Adnexae] : normal [No Tenderness] : no tenderness [Nl Sphincter Tone] : normal sphincter tone [FreeTextEntry9] : hemoccult negative

## 2021-02-19 LAB — HPV HIGH+LOW RISK DNA PNL CVX: NOT DETECTED

## 2021-02-23 ENCOUNTER — APPOINTMENT (OUTPATIENT)
Dept: DERMATOLOGY | Facility: CLINIC | Age: 61
End: 2021-02-23
Payer: COMMERCIAL

## 2021-02-23 PROCEDURE — 99072 ADDL SUPL MATRL&STAF TM PHE: CPT

## 2021-02-23 PROCEDURE — 17262 DSTRJ MAL LES T/A/L 1.1-2.0: CPT

## 2021-03-03 ENCOUNTER — LABORATORY RESULT (OUTPATIENT)
Age: 61
End: 2021-03-03

## 2021-03-03 ENCOUNTER — APPOINTMENT (OUTPATIENT)
Dept: RHEUMATOLOGY | Facility: CLINIC | Age: 61
End: 2021-03-03
Payer: COMMERCIAL

## 2021-03-03 VITALS
DIASTOLIC BLOOD PRESSURE: 72 MMHG | SYSTOLIC BLOOD PRESSURE: 132 MMHG | TEMPERATURE: 97.9 F | BODY MASS INDEX: 44.29 KG/M2 | WEIGHT: 258 LBS | OXYGEN SATURATION: 98 % | HEART RATE: 78 BPM

## 2021-03-03 PROCEDURE — 99213 OFFICE O/P EST LOW 20 MIN: CPT | Mod: 25

## 2021-03-03 PROCEDURE — 99072 ADDL SUPL MATRL&STAF TM PHE: CPT

## 2021-03-03 PROCEDURE — 36415 COLL VENOUS BLD VENIPUNCTURE: CPT

## 2021-03-03 NOTE — PHYSICAL EXAM
[General Appearance - Alert] : alert [General Appearance - Well Nourished] : well nourished [General Appearance - Well Developed] : well developed [Sclera] : the sclera and conjunctiva were normal [Oropharynx] : the oropharynx was normal [Neck Appearance] : the appearance of the neck was normal [Respiration, Rhythm And Depth] : normal respiratory rhythm and effort [Auscultation Breath Sounds / Voice Sounds] : lungs were clear to auscultation bilaterally [Heart Sounds] : normal S1 and S2 [Full Pulse] : the pedal pulses are present [Edema] : there was no peripheral edema [Abdomen Tenderness] : non-tender [Cervical Lymph Nodes Enlarged Anterior Bilaterally] : anterior cervical [Supraclavicular Lymph Nodes Enlarged Bilaterally] : supraclavicular [No Spinal Tenderness] : no spinal tenderness [Abnormal Walk] : normal gait [Nail Clubbing] : no clubbing  or cyanosis of the fingernails [Musculoskeletal - Swelling] : no joint swelling seen [Motor Tone] : muscle strength and tone were normal [] : no rash [FreeTextEntry1] : dry skin [Deep Tendon Reflexes (DTR)] : deep tendon reflexes were 2+ and symmetric [Motor Exam] : the motor exam was normal [Oriented To Time, Place, And Person] : oriented to person, place, and time [Impaired Insight] : insight and judgment were intact [Affect] : the affect was normal

## 2021-03-03 NOTE — HISTORY OF PRESENT ILLNESS
[FreeTextEntry1] : 60 year old  female initially seen in December as a new pt. She was previously under the care of Dr. Infante. She states that she was told that she has rheumatoid arthritis and osteoarthritis. She has not seen him in several years. She does not recall the name of the medications she was treated with, but when I mentioned the DMARDS and the usual RA medications they do not sound familiar. She denies psoriasis.\par \par She was recently seen by her primary care physician and was noted to have a high sedimentation rate, she was told to followup with rheumatology. She has a recent diagnosis of uterine cancer as well now status post surgery and doing well.\par \par She is denies prolonged morning stiffness. She states for about 5-10 minutes. She has joint pains, she rates them as mild to moderate depending on that date and activity level and the weather. Most of the pain is in her knees hips and hands. She admits to dry skin but denies psoriasis. She denies colitis. She denies poor sleep. She thinks she has sleep apnea but is not sure.\par Her initial set of labs with the revealed a high sedimentation rate of 40, her LIZETT is negative but her double-stranded was positive, and was able to add on a crithidia double-stranded DNA which was negative. She also had a weakly positive histone antibody. Her CPKs were mildly elevated. She complains of muscle pain particularly in the upper arms. She complains of fatigue as well. The significance of these labs is unclear, she feels the same as she did several months ago.\par \par There is no family history of autoimmunity.\par \par She has an average appetite and denies weight loss. She denies fevers or chills or night sweats.

## 2021-03-03 NOTE — ASSESSMENT
[FreeTextEntry1] : 60-year-old  female the current diagnosis of pre-diabetes and hypercholesterolemia, recent diagnosis of endometrial cancer status post surgery presents as a second opinion today. She was previously seen by rheumatology diagnosed with osteoarthritis and rheumatoid arthritis as per the patient. This was several years ago. She does not recall the names of medications but the usual rheumatoid arthritis medications do not sound familiar to her. She recently went to see her primary care physician and was noted to have a high sedimentation rate.\par \par Her current review of systems is positive for joint pains and fatigue, but joint pain skin very in intensity from mild to moderate. Her review of systems is otherwise negative. Today, on my exam she has very dry skin without any pitting noted on her nail pits. She has full range of motion of her joints without any evidence of inflammatory arthritis. She does not have any tender points. She has features of osteoarthritis.\par \par Based on my current clinical exam and inflammatory arthropathy seems less likely. Osteoarthritis may be the most likely diagnosis. She does not have tender points which makes fibromyalgia less likely.\par \par Positive double-stranded DNA-\par Her initial set of labs reveal a negative LIZETT but her double-stranded DNA was positive, I was able to add on a crithidia stranded DNA which is specific for lupus. Based on those labs and her review of systems with suspicion for lupus is low. For completion we'll repeat these labs again. She does have a high sedimentation rate of 40, which is normal for her age and also given the fact that she is slightly overweight, and her CPKs are mildly elevated. Given her complaints of muscle pain will obtain workup for myositis as well.\par For now we'll continue with clinical surveillance.\par \par Polyarthralgia-\par She prefers not to use daily medication\par 2 use NSAIDs p.r.n.\par To call if symptoms worsen\par \par Labs to be done today. Pending labs will determine future treatment options. At this time I do not feel that she is a candidate for additional treatment unless her labs reveal otherwise\par \par Followup 3 months to continue with clinical surveillance

## 2021-03-04 LAB — CK SERPL-CCNC: 234 U/L

## 2021-03-05 LAB — DSDNA AB SER-ACNC: 94 IU/ML

## 2021-03-07 LAB
ANA PAT FLD IF-IMP: ABNORMAL
ANA SER IF-ACNC: ABNORMAL

## 2021-03-09 LAB — HISTONE AB SER QL: 1.2 UNITS

## 2021-03-10 ENCOUNTER — NON-APPOINTMENT (OUTPATIENT)
Age: 61
End: 2021-03-10

## 2021-03-19 ENCOUNTER — NON-APPOINTMENT (OUTPATIENT)
Age: 61
End: 2021-03-19

## 2021-03-19 LAB
EJ AB SER QL: NEGATIVE
ENA JO1 AB SER IA-ACNC: <20 UNITS
ENA PM/SCL AB SER-ACNC: <20 UNITS
ENA SM+RNP AB SER IA-ACNC: <20 UNITS
ENA SS-A IGG SER QL: <20 UNITS
FIBRILLARIN AB SER QL: NEGATIVE
KU AB SER QL: NEGATIVE
MDA-5 (P140)(CADM-140): <20 UNITS
MI2 AB SER QL: NEGATIVE
NXP-2 (P140): <20 UNITS
OJ AB SER QL: NEGATIVE
PL12 AB SER QL: NEGATIVE
PL7 AB SER QL: NEGATIVE
SRP AB SERPL QL: NEGATIVE
TIF GAMMA (P155/140): <20 UNITS
U2 SNRNP AB SER QL: NEGATIVE

## 2021-03-23 ENCOUNTER — RESULT REVIEW (OUTPATIENT)
Age: 61
End: 2021-03-23

## 2021-03-23 ENCOUNTER — OUTPATIENT (OUTPATIENT)
Dept: OUTPATIENT SERVICES | Facility: HOSPITAL | Age: 61
LOS: 1 days | End: 2021-03-23
Payer: COMMERCIAL

## 2021-03-23 ENCOUNTER — APPOINTMENT (OUTPATIENT)
Dept: RADIOLOGY | Facility: CLINIC | Age: 61
End: 2021-03-23
Payer: COMMERCIAL

## 2021-03-23 DIAGNOSIS — Z00.00 ENCOUNTER FOR GENERAL ADULT MEDICAL EXAMINATION WITHOUT ABNORMAL FINDINGS: ICD-10-CM

## 2021-03-23 DIAGNOSIS — Z98.890 OTHER SPECIFIED POSTPROCEDURAL STATES: Chronic | ICD-10-CM

## 2021-03-23 PROCEDURE — 77080 DXA BONE DENSITY AXIAL: CPT | Mod: 26

## 2021-03-23 PROCEDURE — 77080 DXA BONE DENSITY AXIAL: CPT

## 2021-03-25 ENCOUNTER — APPOINTMENT (OUTPATIENT)
Dept: MAMMOGRAPHY | Facility: CLINIC | Age: 61
End: 2021-03-25
Payer: COMMERCIAL

## 2021-03-25 ENCOUNTER — APPOINTMENT (OUTPATIENT)
Dept: ULTRASOUND IMAGING | Facility: CLINIC | Age: 61
End: 2021-03-25
Payer: COMMERCIAL

## 2021-03-25 ENCOUNTER — RESULT REVIEW (OUTPATIENT)
Age: 61
End: 2021-03-25

## 2021-03-25 ENCOUNTER — OUTPATIENT (OUTPATIENT)
Dept: OUTPATIENT SERVICES | Facility: HOSPITAL | Age: 61
LOS: 1 days | End: 2021-03-25
Payer: COMMERCIAL

## 2021-03-25 DIAGNOSIS — Z98.890 OTHER SPECIFIED POSTPROCEDURAL STATES: Chronic | ICD-10-CM

## 2021-03-25 DIAGNOSIS — Z00.8 ENCOUNTER FOR OTHER GENERAL EXAMINATION: ICD-10-CM

## 2021-03-25 PROCEDURE — 77063 BREAST TOMOSYNTHESIS BI: CPT | Mod: 26

## 2021-03-25 PROCEDURE — 77063 BREAST TOMOSYNTHESIS BI: CPT

## 2021-03-25 PROCEDURE — 77067 SCR MAMMO BI INCL CAD: CPT

## 2021-03-25 PROCEDURE — 77067 SCR MAMMO BI INCL CAD: CPT | Mod: 26

## 2021-04-02 ENCOUNTER — RESULT REVIEW (OUTPATIENT)
Age: 61
End: 2021-04-02

## 2021-04-02 ENCOUNTER — OUTPATIENT (OUTPATIENT)
Dept: OUTPATIENT SERVICES | Facility: HOSPITAL | Age: 61
LOS: 1 days | End: 2021-04-02
Payer: COMMERCIAL

## 2021-04-02 ENCOUNTER — APPOINTMENT (OUTPATIENT)
Dept: ULTRASOUND IMAGING | Facility: CLINIC | Age: 61
End: 2021-04-02
Payer: COMMERCIAL

## 2021-04-02 DIAGNOSIS — Z98.890 OTHER SPECIFIED POSTPROCEDURAL STATES: Chronic | ICD-10-CM

## 2021-04-02 DIAGNOSIS — Z00.8 ENCOUNTER FOR OTHER GENERAL EXAMINATION: ICD-10-CM

## 2021-04-02 PROCEDURE — 76830 TRANSVAGINAL US NON-OB: CPT

## 2021-04-02 PROCEDURE — 76700 US EXAM ABDOM COMPLETE: CPT | Mod: 26

## 2021-04-02 PROCEDURE — 76700 US EXAM ABDOM COMPLETE: CPT

## 2021-04-02 PROCEDURE — 76856 US EXAM PELVIC COMPLETE: CPT | Mod: 26

## 2021-04-02 PROCEDURE — 76830 TRANSVAGINAL US NON-OB: CPT | Mod: 26

## 2021-04-02 PROCEDURE — 76856 US EXAM PELVIC COMPLETE: CPT

## 2021-04-12 ENCOUNTER — NON-APPOINTMENT (OUTPATIENT)
Age: 61
End: 2021-04-12

## 2021-04-13 ENCOUNTER — NON-APPOINTMENT (OUTPATIENT)
Age: 61
End: 2021-04-13

## 2021-06-02 ENCOUNTER — APPOINTMENT (OUTPATIENT)
Dept: GYNECOLOGIC ONCOLOGY | Facility: CLINIC | Age: 61
End: 2021-06-02
Payer: COMMERCIAL

## 2021-06-02 VITALS — HEIGHT: 64 IN | BODY MASS INDEX: 45.75 KG/M2 | WEIGHT: 268 LBS

## 2021-06-02 PROCEDURE — 99212 OFFICE O/P EST SF 10 MIN: CPT

## 2021-06-02 PROCEDURE — 99072 ADDL SUPL MATRL&STAF TM PHE: CPT

## 2021-06-02 NOTE — PHYSICAL EXAM
[Absent] : Adnexa(ae): Absent [Normal] : Anus and perineum: Normal sphincter tone, no masses, no prolapse. [de-identified] : Lluvia Swift MA was present the entire duration of the patient interaction and gynecological exam. [Restricted in physically strenuous activity but ambulatory and able to carry out work of a light or sedentary nature] : Status 1- Restricted in physically strenuous activity but ambulatory and able to carry out work of a light or sedentary nature, e.g., light house work, office work

## 2021-06-02 NOTE — ASSESSMENT
[FreeTextEntry1] : Pt is a 59 yo with Stage Ia endometroid adenocarcinoma s/p TRH, BSO, SLND, superficial myometrial invasion, no LVI, negative lymph nodes, Grade 1. No evidence of disease. Needs to follow up with GI for fatty liver and possible diverticular disease. No vaginal bleeding or mass and no concern for recurrent disease.

## 2021-06-02 NOTE — HISTORY OF PRESENT ILLNESS
[FreeTextEntry1] : Pt is a 59 yo with Stage Ia endometroid adenocarcinoma s/p TRH, BSO, SLND, superficial myometrial invasion, no LVI, negative lymph nodes, Grade 1. No risk factors and no recommendation for adjuvant treatment. \par \par Interval history:\par She reports being diagnosed with gall stones and fatty liver, which is being managed by hepatologist. She also had a basalcell carcinoma removed. She reports occasional left lower quadrant pain which wakes her up. Mother had bad diverticular disease. She reports no weight loss. She has no new gynecologic complaints, not sexually active due to opposite schedules with . No vaginal bleeding.

## 2021-06-07 ENCOUNTER — APPOINTMENT (OUTPATIENT)
Dept: RHEUMATOLOGY | Facility: CLINIC | Age: 61
End: 2021-06-07
Payer: COMMERCIAL

## 2021-06-07 VITALS
HEART RATE: 77 BPM | OXYGEN SATURATION: 97 % | WEIGHT: 258 LBS | SYSTOLIC BLOOD PRESSURE: 127 MMHG | DIASTOLIC BLOOD PRESSURE: 78 MMHG | BODY MASS INDEX: 44.29 KG/M2 | TEMPERATURE: 97.9 F

## 2021-06-07 PROCEDURE — 99072 ADDL SUPL MATRL&STAF TM PHE: CPT

## 2021-06-07 PROCEDURE — 99214 OFFICE O/P EST MOD 30 MIN: CPT

## 2021-06-07 NOTE — REVIEW OF SYSTEMS
[Feeling Tired] : feeling tired [Arthralgias] : arthralgias [Joint Pain] : joint pain [Negative] : Heme/Lymph [Joint Stiffness] : joint stiffness [As Noted in HPI] : as noted in HPI

## 2021-06-07 NOTE — HISTORY OF PRESENT ILLNESS
[FreeTextEntry1] : DEV LOPEZ is a 60 year old woman, previously under the care of Dr. Infante with ?rheumatoid arthritis and osteoarthritis. She has not seen him in several years. She does not recall the name of the medications she was treated with, but when I mentioned the DMARDS and the usual RA medications they do not sound familiar. She denies psoriasis.\par \par Referred back to rheum in setting of elevated ESR. She has a recent diagnosis of uterine cancer as well now status post surgery and doing well. And then had a basal cell cancer, removed in Feb 2021. \par \par She is denies prolonged morning stiffness. She states for about 5-10 minutes. She has joint pains, she rates them as mild to moderate depending on that date and activity level and the weather. Most of the pain is in her knees hips and hands. She admits to dry skin but denies psoriasis. She denies colitis. She denies poor sleep. She thinks she has sleep apnea but is not sure.\par Her initial set of labs with the revealed a high sedimentation rate of 40, her LIZETT is negative but her double-stranded was positive, and was able to add on a crithidia double-stranded DNA which was negative. She also had a weakly positive histone antibody. Her CPKs were mildly elevated. She complains of muscle pain particularly in the upper arms. She complains of fatigue as well. The significance of these labs is unclear, she feels the same as she did several months ago.\par \par There is no family history of autoimmunity.\par \par She has an average appetite and denies weight loss. She denies fevers or chills or night sweats.\par \par + CTS in past, s/p release b/l, now with recurrent R sided intermittent \par \par Labs - + dsDNA but negative by crithidia, ESR 40, CRP elevated, CPK mildly elevated, histone low +\par Negative - LIZETT, remainder of SLE specifics, RF/CCP, centromere, myositis panel, SPEP \par \par ------------\par 6/7/21 -- Noticing 3rd digits with some stiffness but no synovitis, no limitation to ROM. + Gelling, no prolonged AM stiffness. Severe cramping in b/l calves frequently at night time. Occasional CTS sx in R hand. Also gets cramping in RUE with unhooking her bra. Has tried K supplementation and had adequate PO hydration without improvement. Occasional paresthesias over lateral aspect of R thigh, no focal weakness or myalgias. + fatty liver, being worked up by GI. No other issues, no rashes, oral ulcers, focal alopecia, CP/SALAS/SOB/cough, GI/ sx.

## 2021-06-07 NOTE — PHYSICAL EXAM
[General Appearance - Alert] : alert [General Appearance - Well Nourished] : well nourished [General Appearance - Well Developed] : well developed [Sclera] : the sclera and conjunctiva were normal [Oropharynx] : the oropharynx was normal [Neck Appearance] : the appearance of the neck was normal [Respiration, Rhythm And Depth] : normal respiratory rhythm and effort [Auscultation Breath Sounds / Voice Sounds] : lungs were clear to auscultation bilaterally [Heart Sounds] : normal S1 and S2 [Edema] : there was no peripheral edema [Abdomen Tenderness] : non-tender [Cervical Lymph Nodes Enlarged Anterior Bilaterally] : anterior cervical [Supraclavicular Lymph Nodes Enlarged Bilaterally] : supraclavicular [No Spinal Tenderness] : no spinal tenderness [Abnormal Walk] : normal gait [Nail Clubbing] : no clubbing  or cyanosis of the fingernails [Musculoskeletal - Swelling] : no joint swelling seen [Motor Tone] : muscle strength and tone were normal [Deep Tendon Reflexes (DTR)] : deep tendon reflexes were 2+ and symmetric [Motor Exam] : the motor exam was normal [Oriented To Time, Place, And Person] : oriented to person, place, and time [Impaired Insight] : insight and judgment were intact [Affect] : the affect was normal [General Appearance - In No Acute Distress] : in no acute distress [PERRL With Normal Accommodation] : pupils were equal in size, round, and reactive to light [Extraocular Movements] : extraocular movements were intact [Outer Ear] : the ears and nose were normal in appearance [] : the neck was supple [Thyroid Diffuse Enlargement] : the thyroid was not enlarged [Heart Rate And Rhythm] : heart rate was normal and rhythm regular [Bowel Sounds] : normal bowel sounds [Abdomen Soft] : soft [FreeTextEntry1] : dry skin [Sensation] : the sensory exam was normal to light touch and pinprick

## 2021-06-07 NOTE — ASSESSMENT
[FreeTextEntry1] : DEV LOPEZ is a 60 year old woman being followed by rheumatology for + ESR, low histone, mildly elevated CPK, dsDNA (but negative by crithidia), diffuse OA, and muscle cramps with occasional sensory paresthesias. In setting of recent malignancies -- endometrial, skin -- both removed and stable. Additional PMH of pre-diabetes and hypercholesterolemia, and fatty liver. New to me today. \par \par - discussed that I have an overall low suspicion for inflammatory arthritis or CTD at present given history, exam and lack of antibodies. ESR/CRP nonspecific, dsDNA by more specific testing is negative, histone can cause arthralgias but she lacks the inflammatory sx. CPK is too mildly elevated for myositis. However will surveil\par - OA pain is minimal and she does not feel like she needs chronic meds -- c/w prn tylenol \par - try to add Mg supplementation to see if helps cramps\par - also discussed stretching before bed\par - if neither help, can trial QHS flexeril prn. Advised pt of sedation and advised to take when does not have to drive the following day to evaluate extent of sedation. Can reduce to 2.5mg QHS if getting benefit but too much somnolence. Pt verbalized understanding. \par - if ongoing, next option would be to try holding statin to see if this helps \par - f/u with GI re fatty liver\par - repeat labs as below with next routine blood draw \par - RTC in 4 months, sooner if new/worsening sx

## 2021-08-09 ENCOUNTER — APPOINTMENT (OUTPATIENT)
Dept: DERMATOLOGY | Facility: CLINIC | Age: 61
End: 2021-08-09
Payer: COMMERCIAL

## 2021-08-09 DIAGNOSIS — L82.0 INFLAMED SEBORRHEIC KERATOSIS: ICD-10-CM

## 2021-08-09 DIAGNOSIS — L91.8 OTHER HYPERTROPHIC DISORDERS OF THE SKIN: ICD-10-CM

## 2021-08-09 PROCEDURE — 99214 OFFICE O/P EST MOD 30 MIN: CPT | Mod: 25

## 2021-08-09 PROCEDURE — 17110 DESTRUCTION B9 LES UP TO 14: CPT

## 2021-08-09 NOTE — PHYSICAL EXAM
[Full Body Skin Exam Performed] : performed [FreeTextEntry3] : Skin examination performed of the face, neck, trunk, arms, legs; \par The patient is well, alert and oriented, pleasant and cooperative.\par Eyelids, conjunctivae, oral mucosa, digits and nails all normal.  \par No cervical adenopathy.\par \par Normal findings include:\par \par Seborrheic keratoses\par Angiomas\par + lentigines and solar damage are present in sun exposed areas; face\par \par healed D&C site;  R upper back; \par \par excoriated inflamed papule L upper back; \par \par tags;  b/l upper eyelids; \par \par

## 2021-08-09 NOTE — HISTORY OF PRESENT ILLNESS
[de-identified] : Pt. presents for skin check;\par c/o few spots of concern;  irritated spot on back, skin tags\par Severity:  mild  \par Modifying factors:  none\par Associated symptoms:  none\par Context:  no association with activity\par Recent BCC R upper back

## 2021-08-09 NOTE — ASSESSMENT
[FreeTextEntry1] : Complete skin examination is negative for malignancy; Multiple new concerns were addressed and discussed.\par Therapeutic options and their risks and benefits; along with multiple diagnostic possibilities were discussed at length;\par risks and benefits of skin biopsy and/or other further study were discussed;\par \par Continue regular exams; \par Follow up for TBSE in 6 months - recent BCC\par \par LN2 to inflamed SK\par \par t/c cosmetic removal of eyelid tags;  with lido;  RPP-TPP for all;  cosmetic

## 2021-08-27 LAB
ALBUMIN SERPL ELPH-MCNC: 4.3 G/DL
ALP BLD-CCNC: 77 U/L
ALT SERPL-CCNC: 28 U/L
ANION GAP SERPL CALC-SCNC: 8 MMOL/L
AST SERPL-CCNC: 24 U/L
BASOPHILS # BLD AUTO: 0.04 K/UL
BASOPHILS NFR BLD AUTO: 0.7 %
BILIRUB SERPL-MCNC: 0.4 MG/DL
BUN SERPL-MCNC: 13 MG/DL
CALCIUM SERPL-MCNC: 9.6 MG/DL
CHLORIDE SERPL-SCNC: 105 MMOL/L
CK SERPL-CCNC: 206 U/L
CO2 SERPL-SCNC: 27 MMOL/L
CREAT SERPL-MCNC: 0.87 MG/DL
CRP SERPL-MCNC: 7 MG/L
EOSINOPHIL # BLD AUTO: 0.32 K/UL
EOSINOPHIL NFR BLD AUTO: 5.4 %
ERYTHROCYTE [SEDIMENTATION RATE] IN BLOOD BY WESTERGREN METHOD: 48 MM/HR
GLUCOSE SERPL-MCNC: 114 MG/DL
HCT VFR BLD CALC: 41.8 %
HGB BLD-MCNC: 12.9 G/DL
IMM GRANULOCYTES NFR BLD AUTO: 0.3 %
LYMPHOCYTES # BLD AUTO: 1.86 K/UL
LYMPHOCYTES NFR BLD AUTO: 31.6 %
MAGNESIUM SERPL-MCNC: 2 MG/DL
MAN DIFF?: NORMAL
MCHC RBC-ENTMCNC: 29.9 PG
MCHC RBC-ENTMCNC: 30.9 GM/DL
MCV RBC AUTO: 96.8 FL
MONOCYTES # BLD AUTO: 0.36 K/UL
MONOCYTES NFR BLD AUTO: 6.1 %
NEUTROPHILS # BLD AUTO: 3.29 K/UL
NEUTROPHILS NFR BLD AUTO: 55.9 %
PLATELET # BLD AUTO: 218 K/UL
POTASSIUM SERPL-SCNC: 4.6 MMOL/L
PROT SERPL-MCNC: 6.9 G/DL
RBC # BLD: 4.32 M/UL
RBC # FLD: 12.8 %
SODIUM SERPL-SCNC: 140 MMOL/L
WBC # FLD AUTO: 5.89 K/UL

## 2021-08-30 LAB — ALDOLASE SERPL-CCNC: 4.6 U/L

## 2021-09-02 ENCOUNTER — RESULT CHARGE (OUTPATIENT)
Age: 61
End: 2021-09-02

## 2021-09-02 ENCOUNTER — APPOINTMENT (OUTPATIENT)
Dept: UROGYNECOLOGY | Facility: CLINIC | Age: 61
End: 2021-09-02
Payer: COMMERCIAL

## 2021-09-02 VITALS — DIASTOLIC BLOOD PRESSURE: 72 MMHG | SYSTOLIC BLOOD PRESSURE: 122 MMHG

## 2021-09-02 DIAGNOSIS — R32 UNSPECIFIED URINARY INCONTINENCE: ICD-10-CM

## 2021-09-02 DIAGNOSIS — R39.15 URGENCY OF URINATION: ICD-10-CM

## 2021-09-02 LAB
BILIRUB UR QL STRIP: NEGATIVE
CLARITY UR: CLEAR
COLLECTION METHOD: NORMAL
GLUCOSE UR-MCNC: NEGATIVE
HCG UR QL: 0.2 EU/DL
HGB UR QL STRIP.AUTO: NEGATIVE
KETONES UR-MCNC: NEGATIVE
LEUKOCYTE ESTERASE UR QL STRIP: NORMAL
NITRITE UR QL STRIP: NEGATIVE
PH UR STRIP: 5
PROT UR STRIP-MCNC: NEGATIVE
SP GR UR STRIP: 1.03

## 2021-09-02 PROCEDURE — 51798 US URINE CAPACITY MEASURE: CPT

## 2021-09-02 PROCEDURE — 99213 OFFICE O/P EST LOW 20 MIN: CPT | Mod: 25

## 2021-09-02 NOTE — HISTORY OF PRESENT ILLNESS
[FreeTextEntry1] : In July she developed a sensation right in the area she urinates out of. Once she had this sensation she would have strong urge to void. It would wake her up, she was voiding about 8-9 times at night. She was having the sensation while she was urinating. This went on for about 3 weeks and it started to slowly subside. She is getting up now only 4 times at night. She is not feeling this sensation at all anymore.

## 2021-09-02 NOTE — DISCUSSION/SUMMARY
[FreeTextEntry1] : Healing well, nothing abnormal on exam, other than some mild vaginal atrophy. Encouraged to be seen urgently if this happens again.

## 2021-09-02 NOTE — PHYSICAL EXAM
[Chaperone Present] : A chaperone was present in the examining room during all aspects of the physical examination [Normal Appearance] : general appearance was normal [Atrophy] : atrophy [Absent] : absent [Normal] : no abnormalities [FreeTextEntry4] : no exposure sling nontender

## 2021-10-04 ENCOUNTER — APPOINTMENT (OUTPATIENT)
Dept: RHEUMATOLOGY | Facility: CLINIC | Age: 61
End: 2021-10-04
Payer: COMMERCIAL

## 2021-10-04 VITALS
SYSTOLIC BLOOD PRESSURE: 120 MMHG | HEART RATE: 82 BPM | OXYGEN SATURATION: 95 % | DIASTOLIC BLOOD PRESSURE: 70 MMHG | BODY MASS INDEX: 42.51 KG/M2 | TEMPERATURE: 97.2 F | HEIGHT: 64 IN | WEIGHT: 249 LBS

## 2021-10-04 DIAGNOSIS — R74.8 ABNORMAL LEVELS OF OTHER SERUM ENZYMES: ICD-10-CM

## 2021-10-04 DIAGNOSIS — E55.9 VITAMIN D DEFICIENCY, UNSPECIFIED: ICD-10-CM

## 2021-10-04 PROCEDURE — 99214 OFFICE O/P EST MOD 30 MIN: CPT

## 2021-10-04 NOTE — ASSESSMENT
[FreeTextEntry1] : DEV LOPEZ is a 61 year old woman being followed by rheumatology for + ESR, low histone, mildly elevated CPK, dsDNA (but negative by crithidia), diffuse OA, and muscle cramps with occasional sensory paresthesias. In setting of recent malignancies -- endometrial, skin -- both removed and stable. Additional PMH of pre-diabetes and hypercholesterolemia, and fatty liver.  No new/worsening inflammatory sx since last visit aside from some mild ocular dryness, muscle cramps ongoing but improved, low Vit D, recurrence of mild CTS sx. \par \par - discussed that I continue to have an overall low suspicion for inflammatory arthritis or CTD at present given history, exam and lack of antibodies. ESR/CRP nonspecific, dsDNA by more specific testing is negative, histone can cause arthralgias but she lacks the inflammatory sx. CPK is too mildly elevated for myositis and remains stable. However will surveil as patient feels more comfortable with this -- RTC in 6 months, if remains stable will proceed to annual f/u \par - OA pain is minimal and she does not feel like she needs chronic meds -- c/w prn tylenol \par - conservative electrolyte changes not helpful, will trial 2 weeks of stretching before bed and 2 weeks of 2.5mg flexeril QHS to see if these help. If latter does, can then stop and use prn \par - reviewed recent labs, no further labs needed at present \par - genteal eye drops QHS for dry eye\par - wrist braces b/l QHS for CTS sx \par - agree with PMD plan for Vit D 50K x 3 months, then daily [id number] IU \par - RTC in 6 months, sooner if new/worsening sx

## 2021-10-04 NOTE — PHYSICAL EXAM
[General Appearance - Alert] : alert [General Appearance - In No Acute Distress] : in no acute distress [General Appearance - Well Nourished] : well nourished [General Appearance - Well Developed] : well developed [Sclera] : the sclera and conjunctiva were normal [PERRL With Normal Accommodation] : pupils were equal in size, round, and reactive to light [Extraocular Movements] : extraocular movements were intact [Outer Ear] : the ears and nose were normal in appearance [Oropharynx] : the oropharynx was normal [Neck Appearance] : the appearance of the neck was normal [Respiration, Rhythm And Depth] : normal respiratory rhythm and effort [Auscultation Breath Sounds / Voice Sounds] : lungs were clear to auscultation bilaterally [Heart Rate And Rhythm] : heart rate was normal and rhythm regular [Heart Sounds] : normal S1 and S2 [Edema] : there was no peripheral edema [Bowel Sounds] : normal bowel sounds [Abdomen Soft] : soft [Abdomen Tenderness] : non-tender [Cervical Lymph Nodes Enlarged Anterior Bilaterally] : anterior cervical [Supraclavicular Lymph Nodes Enlarged Bilaterally] : supraclavicular [No Spinal Tenderness] : no spinal tenderness [Abnormal Walk] : normal gait [Nail Clubbing] : no clubbing  or cyanosis of the fingernails [Musculoskeletal - Swelling] : no joint swelling seen [Motor Tone] : muscle strength and tone were normal [] : no rash [Motor Exam] : the motor exam was normal [Oriented To Time, Place, And Person] : oriented to person, place, and time [Impaired Insight] : insight and judgment were intact [Affect] : the affect was normal [No Focal Deficits] : no focal deficits [FreeTextEntry1] : + Fawn b/l

## 2021-10-04 NOTE — HISTORY OF PRESENT ILLNESS
[FreeTextEntry1] : DEV LOPEZ is a 61 year old woman, previously under the care of Dr. Infante with ?rheumatoid arthritis and osteoarthritis. She has not seen him in several years. She does not recall the name of the medications she was treated with, but when I mentioned the DMARDS and the usual RA medications they do not sound familiar. She denies psoriasis.\par \par Referred back to rheum in setting of elevated ESR. She has a recent diagnosis of uterine cancer as well now status post surgery and doing well. And then had a basal cell cancer, removed in Feb 2021. \par \par She is denies prolonged morning stiffness. She states for about 5-10 minutes. She has joint pains, she rates them as mild to moderate depending on that date and activity level and the weather. Most of the pain is in her knees hips and hands. She admits to dry skin but denies psoriasis. She denies colitis. She denies poor sleep. She thinks she has sleep apnea but is not sure.\par Her initial set of labs with the revealed a high sedimentation rate of 40, her LIZETT is negative but her double-stranded was positive, and was able to add on a crithidia double-stranded DNA which was negative. She also had a weakly positive histone antibody. Her CPKs were mildly elevated. She complains of muscle pain particularly in the upper arms. She complains of fatigue as well. The significance of these labs is unclear, she feels the same as she did several months ago.\par \par There is no family history of autoimmunity.\par \par She has an average appetite and denies weight loss. She denies fevers or chills or night sweats.\par \par + CTS in past, s/p release b/l, now with recurrent R sided intermittent \par \par Labs - + dsDNA but negative by crithidia, ESR 40, CRP elevated, CPK mildly elevated, histone low +\par Negative - LIZETT, remainder of SLE specifics, RF/CCP, centromere, myositis panel, SPEP \par \par ------------\par 6/7/21 -- Noticing 3rd digits with some stiffness but no synovitis, no limitation to ROM. + Gelling, no prolonged AM stiffness. Severe cramping in b/l calves frequently at night time. Occasional CTS sx in R hand. Also gets cramping in RUE with unhooking her bra. Has tried K supplementation and had adequate PO hydration without improvement. Occasional paresthesias over lateral aspect of R thigh, no focal weakness or myalgias. + fatty liver, being worked up by GI. No other issues, no rashes, oral ulcers, focal alopecia, CP/SALAS/SOB/cough, GI/ sx. \par \par 10/4/21 -- No new/worsening joint sx, remains with mild stiffness in b/l 3rd digits, some mild recurrence of b/l CTS paresthesias tho able to shake out hands. OA sx stable. Ongoing calf cramps, less frequent but still EOD, never trialled the flexeril. Recent issues with urination resolved. No ocular sicca, but eyelids sometimes stuck in AM. CTD/inflammatory arthritis ROS negative.

## 2021-10-04 NOTE — REVIEW OF SYSTEMS
[Arthralgias] : arthralgias [Joint Pain] : joint pain [Negative] : Heme/Lymph [As Noted in HPI] : as noted in HPI [FreeTextEntry2] : improved energy since decreasing sugar intake

## 2021-12-08 ENCOUNTER — APPOINTMENT (OUTPATIENT)
Dept: GYNECOLOGIC ONCOLOGY | Facility: CLINIC | Age: 61
End: 2021-12-08
Payer: COMMERCIAL

## 2021-12-08 VITALS — WEIGHT: 245 LBS | BODY MASS INDEX: 41.83 KG/M2 | HEIGHT: 64 IN

## 2021-12-08 VITALS — BODY MASS INDEX: 41.2 KG/M2 | WEIGHT: 240 LBS

## 2021-12-08 PROCEDURE — 99213 OFFICE O/P EST LOW 20 MIN: CPT

## 2022-02-01 NOTE — PHYSICAL EXAM
[Chaperone Present] : A chaperone was present in the examining room during all aspects of the physical examination [Absent] : Adnexa(ae): Absent [Normal] : Anus and perineum: Normal sphincter tone, no masses, no prolapse. [Restricted in physically strenuous activity but ambulatory and able to carry out work of a light or sedentary nature] : Status 1- Restricted in physically strenuous activity but ambulatory and able to carry out work of a light or sedentary nature, e.g., light house work, office work [FreeTextEntry1] : Elizabeth Kellogg MA was present the entire duration of the patient interaction and gynecological exam. [de-identified] : Lluvia Swift MA was present the entire duration of the patient interaction and gynecological exam.

## 2022-02-01 NOTE — HISTORY OF PRESENT ILLNESS
[FreeTextEntry1] : Pt is a 62 yo with Stage Ia endometroid adenocarcinoma s/p TRH, BSO, SLND, superficial myometrial invasion, no LVI, negative lymph nodes, Grade 1. No risk factors and no recommendation for adjuvant treatment. \par \par Interval history:\par She was diagnosed with Vitamin D deficiency and started on supplementation. No other changes in medical/surgical history. She continues to loose weight. She has followed with her GI.\par \par Mammogram: 3/2021 - Birads 2\par Colonoscopy: followed by GI.\par Covid: vaccine x 2

## 2022-02-01 NOTE — DISCUSSION/SUMMARY
[FreeTextEntry1] : Discussed surveillance with patient and rationale for physical exams and imaging only based on new symptoms. We discussed that likelihood of her recurrence is very low. She was upset because she had difficulty getting an appointment with Dr. Sorto and blames it on the phone service and her diagnosis could have been made sooner. Will review MSI testing.

## 2022-02-01 NOTE — ASSESSMENT
[FreeTextEntry1] : Pt is a 60 yo with Stage Ia endometroid adenocarcinoma s/p TRH, BSO, SLND, superficial myometrial invasion, no LVI, negative lymph nodes, Grade 1. No evidence of disease.  No vaginal bleeding or mass and no concern for recurrent disease.

## 2022-02-14 ENCOUNTER — APPOINTMENT (OUTPATIENT)
Dept: DERMATOLOGY | Facility: CLINIC | Age: 62
End: 2022-02-14
Payer: COMMERCIAL

## 2022-02-14 PROCEDURE — 99214 OFFICE O/P EST MOD 30 MIN: CPT

## 2022-02-14 RX ORDER — CHROMIUM 200 MCG
TABLET ORAL
Refills: 0 | Status: ACTIVE | COMMUNITY

## 2022-02-14 RX ORDER — MOMETASONE FUROATE 1 MG/G
0.1 OINTMENT TOPICAL
Qty: 1 | Refills: 2 | Status: ACTIVE | COMMUNITY
Start: 2022-02-14 | End: 1900-01-01

## 2022-02-14 RX ORDER — CYCLOBENZAPRINE HYDROCHLORIDE 5 MG/1
5 TABLET, FILM COATED ORAL
Qty: 60 | Refills: 2 | Status: DISCONTINUED | COMMUNITY
Start: 2021-06-07 | End: 2022-02-14

## 2022-02-14 RX ORDER — VITAMIN B COMPLEX
CAPSULE ORAL
Refills: 0 | Status: ACTIVE | COMMUNITY

## 2022-02-14 NOTE — ASSESSMENT
[FreeTextEntry1] : Complete skin examination is negative for malignancy; Multiple new concerns were addressed and discussed.\par Therapeutic options and their risks and benefits; along with multiple diagnostic possibilities were discussed at length;\par risks and benefits of skin biopsy and/or other further study were discussed;\par \par Continue regular exams; \par Follow up for TBSE in 6 months - recent BCC 2/2021\par \par Hand eczema;  using eucerin;  continue;  add mometasone ointment BID prn for cracking;  proper use explained; \par

## 2022-02-14 NOTE — PHYSICAL EXAM
[Full Body Skin Exam Performed] : performed [FreeTextEntry3] : Skin examination performed of the face, neck, trunk, arms, legs; \par The patient is well, alert and oriented, pleasant and cooperative.\par Eyelids, conjunctivae, oral mucosa, digits and nails all normal.  \par No cervical adenopathy.\par \par Normal findings include:\par \par Seborrheic keratoses- multiple\par Angiomas\par + lentigines and solar damage are present in sun exposed areas; face\par \par healed D&C site;  R upper back; \par \par No lesions suspicious for malignancy. \par \par hands;  + dryness; + cracking at fingertips

## 2022-02-14 NOTE — HISTORY OF PRESENT ILLNESS
[de-identified] : Pt. presents for skin check;\par c/o few spots of concern;  c/o dryness, cracking on hands\par Severity:  mild  \par Modifying factors:  none\par Associated symptoms:  none\par Context:  no association with activity \par Recent BCC R upper back 2/2021

## 2022-04-04 ENCOUNTER — APPOINTMENT (OUTPATIENT)
Dept: RHEUMATOLOGY | Facility: CLINIC | Age: 62
End: 2022-04-04

## 2022-04-18 ENCOUNTER — APPOINTMENT (OUTPATIENT)
Dept: RHEUMATOLOGY | Facility: CLINIC | Age: 62
End: 2022-04-18
Payer: COMMERCIAL

## 2022-04-18 VITALS
SYSTOLIC BLOOD PRESSURE: 120 MMHG | TEMPERATURE: 96.4 F | DIASTOLIC BLOOD PRESSURE: 60 MMHG | HEART RATE: 60 BPM | HEIGHT: 64 IN | WEIGHT: 240 LBS | BODY MASS INDEX: 40.97 KG/M2 | OXYGEN SATURATION: 98 %

## 2022-04-18 DIAGNOSIS — R25.2 CRAMP AND SPASM: ICD-10-CM

## 2022-04-18 DIAGNOSIS — R25.3 FASCICULATION: ICD-10-CM

## 2022-04-18 DIAGNOSIS — H04.123 DRY EYE SYNDROME OF BILATERAL LACRIMAL GLANDS: ICD-10-CM

## 2022-04-18 DIAGNOSIS — R20.2 PARESTHESIA OF SKIN: ICD-10-CM

## 2022-04-18 PROCEDURE — 99213 OFFICE O/P EST LOW 20 MIN: CPT

## 2022-04-18 NOTE — PHYSICAL EXAM
[General Appearance - Alert] : alert [General Appearance - In No Acute Distress] : in no acute distress [General Appearance - Well Nourished] : well nourished [General Appearance - Well Developed] : well developed [Sclera] : the sclera and conjunctiva were normal [PERRL With Normal Accommodation] : pupils were equal in size, round, and reactive to light [Extraocular Movements] : extraocular movements were intact [Outer Ear] : the ears and nose were normal in appearance [Oropharynx] : the oropharynx was normal [Neck Appearance] : the appearance of the neck was normal [Respiration, Rhythm And Depth] : normal respiratory rhythm and effort [Auscultation Breath Sounds / Voice Sounds] : lungs were clear to auscultation bilaterally [Heart Rate And Rhythm] : heart rate was normal and rhythm regular [Heart Sounds] : normal S1 and S2 [Edema] : there was no peripheral edema [Abdomen Soft] : soft [Abdomen Tenderness] : non-tender [Cervical Lymph Nodes Enlarged Anterior Bilaterally] : anterior cervical [Supraclavicular Lymph Nodes Enlarged Bilaterally] : supraclavicular [No Spinal Tenderness] : no spinal tenderness [Abnormal Walk] : normal gait [Nail Clubbing] : no clubbing  or cyanosis of the fingernails [Musculoskeletal - Swelling] : no joint swelling seen [Motor Tone] : muscle strength and tone were normal [] : no rash [Motor Exam] : the motor exam was normal [No Focal Deficits] : no focal deficits [Oriented To Time, Place, And Person] : oriented to person, place, and time [Impaired Insight] : insight and judgment were intact [Affect] : the affect was normal [Murmurs] : no murmurs [FreeTextEntry1] : + Tinels b/l, decreased sensation to light touch over R anterior thigh

## 2022-04-18 NOTE — ASSESSMENT
[FreeTextEntry1] : DEV LOPEZ is a 61 year old woman being followed by rheumatology for + ESR, low histone, mildly elevated CPK, dsDNA (but negative by crithidia), diffuse OA, and muscle cramps with occasional sensory paresthesias. In setting of recent malignancies -- endometrial, skin -- both removed and stable. Additional PMH of pre-diabetes and hypercholesterolemia, and fatty liver.\par \par # surveillance for development of CTD process \par - discussed that I continue to have an overall low suspicion for inflammatory arthritis or CTD at present given history, exam and lack of antibodies. ESR/CRP nonspecific, dsDNA by more specific testing is negative, histone can cause arthralgias but she lacks the inflammatory sx. CPK is too mildly elevated for myositis and remains stable. \par - no further labs from rheum standpoint \par - genteal eye drops QHS for dry eye PRN  \par \par # OA related pain \par - c/w prn tylenol \par \par # muscle cramps, cold sensation in feet, numbness over R thigh, fasciculations over upper arms \par - c/w conservative electrolyte supplementation and PO hydration as cramps have improved.\par - neuro referral \par \par # bone health \par - c/w Vit D daily [id number] IU \par \par RTC in 6 months, sooner if new/worsening sx

## 2022-04-18 NOTE — HISTORY OF PRESENT ILLNESS
[FreeTextEntry1] : DEV LOPEZ is a 61 year old woman, previously under the care of Dr. Infante with ?rheumatoid arthritis and osteoarthritis. She has not seen him in several years. She does not recall the name of the medications she was treated with, but when I mentioned the DMARDS and the usual RA medications they do not sound familiar. She denies psoriasis.\par \par Referred back to rheum in setting of elevated ESR. She has a recent diagnosis of uterine cancer as well now status post surgery and doing well. And then had a basal cell cancer, removed in Feb 2021. \par \par She is denies prolonged morning stiffness. She states for about 5-10 minutes. She has joint pains, she rates them as mild to moderate depending on that date and activity level and the weather. Most of the pain is in her knees hips and hands. She admits to dry skin but denies psoriasis. She denies colitis. She denies poor sleep. She thinks she has sleep apnea but is not sure.\par Her initial set of labs with the revealed a high sedimentation rate of 40, her LIZETT is negative but her double-stranded was positive, and was able to add on a crithidia double-stranded DNA which was negative. She also had a weakly positive histone antibody. Her CPKs were mildly elevated. She complains of muscle pain particularly in the upper arms. She complains of fatigue as well. The significance of these labs is unclear, she feels the same as she did several months ago.\par \par There is no family history of autoimmunity.\par \par She has an average appetite and denies weight loss. She denies fevers or chills or night sweats.\par \par + CTS in past, s/p release b/l, now with recurrent R sided intermittent \par \par Labs - + dsDNA but negative by crithidia, ESR 40, CRP elevated, CPK mildly elevated, histone low +\par Negative - LIZETT, remainder of SLE specifics, RF/CCP, centromere, myositis panel, SPEP \par \par ------------\par 6/7/21 -- Noticing 3rd digits with some stiffness but no synovitis, no limitation to ROM. + Gelling, no prolonged AM stiffness. Severe cramping in b/l calves frequently at night time. Occasional CTS sx in R hand. Also gets cramping in RUE with unhooking her bra. Has tried K supplementation and had adequate PO hydration without improvement. Occasional paresthesias over lateral aspect of R thigh, no focal weakness or myalgias. + fatty liver, being worked up by GI. No other issues, no rashes, oral ulcers, focal alopecia, CP/SALAS/SOB/cough, GI/ sx. \par \par 10/4/21 -- No new/worsening joint sx, remains with mild stiffness in b/l 3rd digits, some mild recurrence of b/l CTS paresthesias tho able to shake out hands. OA sx stable. Ongoing calf cramps, less frequent but still EOD, never trialled the flexeril. Recent issues with urination resolved. No ocular sicca, but eyelids sometimes stuck in AM. CTD/inflammatory arthritis ROS negative. \par \par 4/18/22 -- Dry eyes markedly improved, no other dryness. Ongoing cold feet, R >>L, hard to sleep at night 2/2 it. Less nocturnal cramps but still ongoing. Numbness over R calf. Worsening OA related pain in knees, low back, hips. No inflammatory arthritis sx, CTD ROS otherwise negative.

## 2022-04-18 NOTE — REVIEW OF SYSTEMS
[Arthralgias] : arthralgias [Joint Pain] : joint pain [As Noted in HPI] : as noted in HPI [Negative] : Genitourinary [FreeTextEntry2] : improved energy since decreasing sugar intake  [FreeTextEntry9] : cramping

## 2022-06-01 ENCOUNTER — APPOINTMENT (OUTPATIENT)
Dept: GYNECOLOGIC ONCOLOGY | Facility: CLINIC | Age: 62
End: 2022-06-01
Payer: COMMERCIAL

## 2022-06-01 VITALS
SYSTOLIC BLOOD PRESSURE: 116 MMHG | BODY MASS INDEX: 40.97 KG/M2 | HEIGHT: 64 IN | DIASTOLIC BLOOD PRESSURE: 64 MMHG | WEIGHT: 240 LBS

## 2022-06-01 PROCEDURE — 99212 OFFICE O/P EST SF 10 MIN: CPT

## 2022-06-01 NOTE — HISTORY OF PRESENT ILLNESS
[FreeTextEntry1] : Pt is a 62 yo with Stage Ia endometroid adenocarcinoma s/p TRH, BSO, SLND, superficial myometrial invasion, no LVI, negative lymph nodes, Grade 1. No risk factors and no recommendation for adjuvant treatment. \par \par Interval history:\par She is taking her Vitamin D but reports not feeling any different. She has no new gynecologic issues, no changes in medical/surgical history.\par \par Mammogram: 3/2021 - Birads 2, due will be ordered by Dr. Sorto (per patient)\par Colonoscopy: due to have it done this summer\par Covid: vaccine x 2

## 2022-06-01 NOTE — END OF VISIT
[FreeTextEntry3] : RTC in 6 months for surveillance [FreeTextEntry2] : Kristi Weinberg MA was present the entire duration of the patient interaction and gynecological exam.\par

## 2022-06-01 NOTE — PHYSICAL EXAM
[Chaperone Present] : A chaperone was present in the examining room during all aspects of the physical examination [FreeTextEntry1] : Kristi Weinberg MA was present the entire duration of the patient interaction and gynecological exam. [Absent] : Adnexa(ae): Absent [Normal] : Anus and perineum: Normal sphincter tone, no masses, no prolapse. [Restricted in physically strenuous activity but ambulatory and able to carry out work of a light or sedentary nature] : Status 1- Restricted in physically strenuous activity but ambulatory and able to carry out work of a light or sedentary nature, e.g., light house work, office work

## 2022-06-08 ENCOUNTER — APPOINTMENT (OUTPATIENT)
Dept: OBGYN | Facility: CLINIC | Age: 62
End: 2022-06-08
Payer: COMMERCIAL

## 2022-06-08 VITALS
WEIGHT: 248 LBS | TEMPERATURE: 98 F | BODY MASS INDEX: 42.57 KG/M2 | SYSTOLIC BLOOD PRESSURE: 120 MMHG | DIASTOLIC BLOOD PRESSURE: 68 MMHG

## 2022-06-08 DIAGNOSIS — Z01.419 ENCOUNTER FOR GYNECOLOGICAL EXAMINATION (GENERAL) (ROUTINE) W/OUT ABNORMAL FINDINGS: ICD-10-CM

## 2022-06-08 DIAGNOSIS — R23.2 FLUSHING: ICD-10-CM

## 2022-06-08 DIAGNOSIS — Z85.42 PERSONAL HISTORY OF MALIGNANT NEOPLASM OF OTHER PARTS OF UTERUS: ICD-10-CM

## 2022-06-08 DIAGNOSIS — Z85.820 PERSONAL HISTORY OF MALIGNANT MELANOMA OF SKIN: ICD-10-CM

## 2022-06-08 DIAGNOSIS — N95.2 POSTMENOPAUSAL ATROPHIC VAGINITIS: ICD-10-CM

## 2022-06-08 LAB
CARD LOT #: NORMAL
CARD LOT EXP DATE: NORMAL
DATE COLLECTED: NORMAL
DEVELOPER LOT #: NORMAL
DEVELOPER LOT EXP DATE: NORMAL
HEMOCCULT SP1 STL QL: NEGATIVE
QUALITY CONTROL: YES

## 2022-06-08 PROCEDURE — 99396 PREV VISIT EST AGE 40-64: CPT

## 2022-06-08 PROCEDURE — 82270 OCCULT BLOOD FECES: CPT

## 2022-06-08 NOTE — HISTORY OF PRESENT ILLNESS
[FreeTextEntry1] : Patient is a 61-year-old female presents for routine annual gynecologic examination.  Patient with a personal history of endometrial cancer status post KELSEY/BSO.  Patient has no complaints.  Patient is also followed by GYN oncology.  Patient's last mammogram was 2020 last bone density was 2020 as well

## 2022-06-09 LAB — HPV HIGH+LOW RISK DNA PNL CVX: NOT DETECTED

## 2022-06-10 LAB — CYTOLOGY CVX/VAG DOC THIN PREP: NORMAL

## 2022-06-30 ENCOUNTER — RESULT REVIEW (OUTPATIENT)
Age: 62
End: 2022-06-30

## 2022-06-30 ENCOUNTER — OUTPATIENT (OUTPATIENT)
Dept: OUTPATIENT SERVICES | Facility: HOSPITAL | Age: 62
LOS: 1 days | End: 2022-06-30
Payer: COMMERCIAL

## 2022-06-30 ENCOUNTER — APPOINTMENT (OUTPATIENT)
Dept: MAMMOGRAPHY | Facility: CLINIC | Age: 62
End: 2022-06-30

## 2022-06-30 DIAGNOSIS — Z00.8 ENCOUNTER FOR OTHER GENERAL EXAMINATION: ICD-10-CM

## 2022-06-30 DIAGNOSIS — Z98.890 OTHER SPECIFIED POSTPROCEDURAL STATES: Chronic | ICD-10-CM

## 2022-06-30 PROCEDURE — 77063 BREAST TOMOSYNTHESIS BI: CPT

## 2022-06-30 PROCEDURE — 77067 SCR MAMMO BI INCL CAD: CPT | Mod: 26

## 2022-06-30 PROCEDURE — 77063 BREAST TOMOSYNTHESIS BI: CPT | Mod: 26

## 2022-06-30 PROCEDURE — 77067 SCR MAMMO BI INCL CAD: CPT

## 2022-07-01 ENCOUNTER — RESULT REVIEW (OUTPATIENT)
Age: 62
End: 2022-07-01

## 2022-08-05 ENCOUNTER — APPOINTMENT (OUTPATIENT)
Dept: DERMATOLOGY | Facility: CLINIC | Age: 62
End: 2022-08-05

## 2022-08-05 PROCEDURE — 99214 OFFICE O/P EST MOD 30 MIN: CPT

## 2022-08-05 NOTE — PHYSICAL EXAM
[Full Body Skin Exam Performed] : performed [FreeTextEntry3] : Skin examination performed of the face, neck, trunk, arms, legs; \par The patient is well, alert and oriented, pleasant and cooperative.\par Eyelids, conjunctivae, oral mucosa, digits and nails all normal.  \par No cervical adenopathy.\par \par Normal findings include:\par \par Seborrheic keratoses- multiple\par Angiomas\par + lentigines and solar damage are present in sun exposed areas; face\par \par healed D&C site;  R upper back; with hypertrophy; \par \par No lesions suspicious for malignancy. \par

## 2022-08-05 NOTE — HISTORY OF PRESENT ILLNESS
[de-identified] : Pt. presents for skin check;\par c/o few spots of concern;  c/o itching at prior D&C site on back\par Severity:  mild  \par Modifying factors:  none\par Associated symptoms:  none\par Context:  no association with activity \par Recent BCC R upper back 2/2021

## 2022-10-03 ENCOUNTER — APPOINTMENT (OUTPATIENT)
Dept: RHEUMATOLOGY | Facility: CLINIC | Age: 62
End: 2022-10-03

## 2022-10-03 VITALS
BODY MASS INDEX: 41.71 KG/M2 | HEART RATE: 78 BPM | WEIGHT: 243 LBS | OXYGEN SATURATION: 98 % | DIASTOLIC BLOOD PRESSURE: 74 MMHG | SYSTOLIC BLOOD PRESSURE: 122 MMHG | TEMPERATURE: 97.6 F

## 2022-10-03 DIAGNOSIS — G47.62 SLEEP RELATED LEG CRAMPS: ICD-10-CM

## 2022-10-03 DIAGNOSIS — R70.0 ELEVATED ERYTHROCYTE SEDIMENTATION RATE: ICD-10-CM

## 2022-10-03 DIAGNOSIS — M54.50 LOW BACK PAIN, UNSPECIFIED: ICD-10-CM

## 2022-10-03 DIAGNOSIS — M54.2 CERVICALGIA: ICD-10-CM

## 2022-10-03 DIAGNOSIS — G56.03 CARPAL TUNNEL SYNDROM,BILATERAL UPPER LIMBS: ICD-10-CM

## 2022-10-03 DIAGNOSIS — G89.29 LOW BACK PAIN, UNSPECIFIED: ICD-10-CM

## 2022-10-03 DIAGNOSIS — M25.552 PAIN IN RIGHT HIP: ICD-10-CM

## 2022-10-03 DIAGNOSIS — R76.8 OTHER SPECIFIED ABNORMAL IMMUNOLOGICAL FINDINGS IN SERUM: ICD-10-CM

## 2022-10-03 DIAGNOSIS — M25.551 PAIN IN RIGHT HIP: ICD-10-CM

## 2022-10-03 DIAGNOSIS — M79.2 NEURALGIA AND NEURITIS, UNSPECIFIED: ICD-10-CM

## 2022-10-03 DIAGNOSIS — M25.50 PAIN IN UNSPECIFIED JOINT: ICD-10-CM

## 2022-10-03 DIAGNOSIS — M15.9 POLYOSTEOARTHRITIS, UNSPECIFIED: ICD-10-CM

## 2022-10-03 PROCEDURE — 99214 OFFICE O/P EST MOD 30 MIN: CPT

## 2022-10-04 PROBLEM — M25.50 POLYARTHRALGIA: Status: ACTIVE | Noted: 2020-12-10

## 2022-10-04 NOTE — REVIEW OF SYSTEMS
[Arthralgias] : arthralgias [Joint Pain] : joint pain [As Noted in HPI] : as noted in HPI [Negative] : Heme/Lymph [Joint Swelling] : no joint swelling [Joint Stiffness] : no joint stiffness [FreeTextEntry2] : improved energy since decreasing sugar intake  [FreeTextEntry9] : cramping

## 2022-10-04 NOTE — PHYSICAL EXAM
[General Appearance - Alert] : alert [General Appearance - In No Acute Distress] : in no acute distress [General Appearance - Well Nourished] : well nourished [General Appearance - Well Developed] : well developed [Sclera] : the sclera and conjunctiva were normal [PERRL With Normal Accommodation] : pupils were equal in size, round, and reactive to light [Extraocular Movements] : extraocular movements were intact [Outer Ear] : the ears and nose were normal in appearance [Oropharynx] : the oropharynx was normal [Neck Appearance] : the appearance of the neck was normal [Respiration, Rhythm And Depth] : normal respiratory rhythm and effort [Auscultation Breath Sounds / Voice Sounds] : lungs were clear to auscultation bilaterally [Heart Rate And Rhythm] : heart rate was normal and rhythm regular [Heart Sounds] : normal S1 and S2 [Murmurs] : no murmurs [Edema] : there was no peripheral edema [Abdomen Soft] : soft [Abdomen Tenderness] : non-tender [No Spinal Tenderness] : no spinal tenderness [Abnormal Walk] : normal gait [Nail Clubbing] : no clubbing  or cyanosis of the fingernails [Musculoskeletal - Swelling] : no joint swelling seen [Motor Tone] : muscle strength and tone were normal [] : no rash [Motor Exam] : the motor exam was normal [No Focal Deficits] : no focal deficits [Oriented To Time, Place, And Person] : oriented to person, place, and time [Impaired Insight] : insight and judgment were intact [Affect] : the affect was normal [No CVA Tenderness] : no ~M costovertebral angle tenderness [FreeTextEntry1] : + Tinels b/l, decreased sensation to light touch over R anterior thigh, strength intact x 4

## 2022-10-04 NOTE — ASSESSMENT
[FreeTextEntry1] : DEV LOPEZ is a 62 year old woman being followed by rheumatology for + ESR, low histone, mildly elevated CPK, dsDNA (but negative by crithidia), diffuse OA, worsening spinal sx, ongoing muscle cramps and sensory paresthesias. In setting of recent malignancies -- endometrial, skin -- both removed and stable. Additional PMH of pre-diabetes and hypercholesterolemia, and fatty liver.\par \par # surveillance for development of CTD process \par - reviewed recent labs, stable from rheum standpoint, CPK in her usual range\par - discussed that I continue to have an overall low suspicion for inflammatory arthritis or CTD at present given history, exam and lack of antibodies. ESR/CRP nonspecific, dsDNA by more specific testing is negative, histone can cause arthralgias but she lacks the inflammatory sx. CPK is too mildly elevated for myositis \par - no further labs from rheum standpoint \par - genteal eye drops QHS for dry eye PRN  \par \par # OA related pain, worsening spinal and hip pain\par - c/w prn tylenol \par - XR C spine and LS spine with hips as well \par \par # muscle cramps, cold sensation in feet, numbness over R thigh, myalgias and ?fasciculations over upper arms \par - c/w conservative electrolyte supplementation and PO hydration as prior cramps responsive to this \par - neuro referral reissued \par \par # bone health \par - c/w Vit D daily [id number] IU \par \par RTC in 6 months, sooner if new/worsening sx or if recommended to return sooner per neuro

## 2022-10-04 NOTE — HISTORY OF PRESENT ILLNESS
[FreeTextEntry1] : DEV LOPEZ is a 61 year old woman, previously under the care of Dr. Infante with ?rheumatoid arthritis and osteoarthritis. She has not seen him in several years. She does not recall the name of the medications she was treated with, but when I mentioned the DMARDS and the usual RA medications they do not sound familiar. She denies psoriasis.\par \par Referred back to rheum in setting of elevated ESR. She has a recent diagnosis of uterine cancer as well now status post surgery and doing well. And then had a basal cell cancer, removed in Feb 2021. \par \par She is denies prolonged morning stiffness. She states for about 5-10 minutes. She has joint pains, she rates them as mild to moderate depending on that date and activity level and the weather. Most of the pain is in her knees hips and hands. She admits to dry skin but denies psoriasis. She denies colitis. She denies poor sleep. She thinks she has sleep apnea but is not sure.\par Her initial set of labs with the revealed a high sedimentation rate of 40, her LIZETT is negative but her double-stranded was positive, and was able to add on a crithidia double-stranded DNA which was negative. She also had a weakly positive histone antibody. Her CPKs were mildly elevated. She complains of muscle pain particularly in the upper arms. She complains of fatigue as well. The significance of these labs is unclear, she feels the same as she did several months ago.\par \par There is no family history of autoimmunity.\par \par She has an average appetite and denies weight loss. She denies fevers or chills or night sweats.\par \par + CTS in past, s/p release b/l, now with recurrent R sided intermittent \par \par Labs - + dsDNA but negative by crithidia, ESR 40, CRP elevated, CPK mildly elevated, histone low +\par Negative - LIZETT, remainder of SLE specifics, RF/CCP, centromere, myositis panel, SPEP \par \par ------------\par 6/7/21 -- Noticing 3rd digits with some stiffness but no synovitis, no limitation to ROM. + Gelling, no prolonged AM stiffness. Severe cramping in b/l calves frequently at night time. Occasional CTS sx in R hand. Also gets cramping in RUE with unhooking her bra. Has tried K supplementation and had adequate PO hydration without improvement. Occasional paresthesias over lateral aspect of R thigh, no focal weakness or myalgias. + fatty liver, being worked up by GI. No other issues, no rashes, oral ulcers, focal alopecia, CP/SALAS/SOB/cough, GI/ sx. \par \par 10/4/21 -- No new/worsening joint sx, remains with mild stiffness in b/l 3rd digits, some mild recurrence of b/l CTS paresthesias tho able to shake out hands. OA sx stable. Ongoing calf cramps, less frequent but still EOD, never trialled the flexeril. Recent issues with urination resolved. No ocular sicca, but eyelids sometimes stuck in AM. CTD/inflammatory arthritis ROS negative. \par \par 4/18/22 -- Dry eyes markedly improved, no other dryness. Ongoing cold feet, R >>L, hard to sleep at night 2/2 it. Less nocturnal cramps but still ongoing. Numbness over R calf. Worsening OA related pain in knees, low back, hips. No inflammatory arthritis sx, CTD ROS otherwise negative.\par \par 10/3/22 -- Arthralgias in hips, knees, ankles, worst with use, also with progressive C spine and low back pain but no sciatica pain. Myalgias in UE, no focal joint issues, intermittent CTS ongoing. Cramping in legs worse in last week. Has not seen neuro. CTD ROS remains negative.

## 2022-10-17 ENCOUNTER — RESULT REVIEW (OUTPATIENT)
Age: 62
End: 2022-10-17

## 2022-10-19 ENCOUNTER — APPOINTMENT (OUTPATIENT)
Dept: RADIOLOGY | Facility: CLINIC | Age: 62
End: 2022-10-19

## 2022-10-19 ENCOUNTER — OUTPATIENT (OUTPATIENT)
Dept: OUTPATIENT SERVICES | Facility: HOSPITAL | Age: 62
LOS: 1 days | End: 2022-10-19
Payer: COMMERCIAL

## 2022-10-19 DIAGNOSIS — M25.50 PAIN IN UNSPECIFIED JOINT: ICD-10-CM

## 2022-10-19 DIAGNOSIS — Z98.890 OTHER SPECIFIED POSTPROCEDURAL STATES: Chronic | ICD-10-CM

## 2022-10-19 PROCEDURE — 73521 X-RAY EXAM HIPS BI 2 VIEWS: CPT | Mod: 26

## 2022-10-19 PROCEDURE — 73521 X-RAY EXAM HIPS BI 2 VIEWS: CPT

## 2022-10-19 PROCEDURE — 72052 X-RAY EXAM NECK SPINE 6/>VWS: CPT | Mod: 26

## 2022-10-19 PROCEDURE — 72052 X-RAY EXAM NECK SPINE 6/>VWS: CPT

## 2022-10-19 PROCEDURE — 72100 X-RAY EXAM L-S SPINE 2/3 VWS: CPT

## 2022-10-19 PROCEDURE — 72100 X-RAY EXAM L-S SPINE 2/3 VWS: CPT | Mod: 26

## 2022-11-07 ENCOUNTER — NON-APPOINTMENT (OUTPATIENT)
Age: 62
End: 2022-11-07

## 2022-11-07 ENCOUNTER — APPOINTMENT (OUTPATIENT)
Dept: NEUROLOGY | Facility: CLINIC | Age: 62
End: 2022-11-07

## 2022-11-07 VITALS
HEART RATE: 67 BPM | TEMPERATURE: 97.8 F | HEIGHT: 64 IN | WEIGHT: 229 LBS | SYSTOLIC BLOOD PRESSURE: 119 MMHG | DIASTOLIC BLOOD PRESSURE: 75 MMHG | BODY MASS INDEX: 39.09 KG/M2

## 2022-11-07 PROCEDURE — 99213 OFFICE O/P EST LOW 20 MIN: CPT

## 2022-11-07 NOTE — DISCUSSION/SUMMARY
[FreeTextEntry1] : 62-year-old woman, complaining of numbness of the hands, history of carpal tunnel syndrome, cramps involving more the right side arm and leg as well as the left. No long track signs on examination.\par Plan: We will schedule her for an EMG nerve conduction study of the right arm and left leg.  r/o peripheral neuropathy,  recurrent carpal tunnel syndrome.\par

## 2022-11-07 NOTE — HISTORY OF PRESENT ILLNESS
[FreeTextEntry1] : 52-year-old woman right-handed with a history of history of bilateral carpal tunnel syndrome.  Complaining of numbness and tingling feeling more on the right side than the left, occasional cramps in arms and legs.  History of carpal tunnel release surgery many years ago.  Occasionally complains of numbness tingling feelings involving the right upper extremity on the left, occasional cramps in the right upper arm.\par History of numbness tingling in the lateral aspect of the right thigh, not related to sleep.  Occasional coldness sensation in the feet.\par Denies significant neck pain, occasional low back pain.\par Recent x-rays of the cervical and lumbar spine, demonstrated degenerative changes.  Recent blood work in August, was negative CPK of 192, slightly elevated, hemoglobin A1c was 6.2.\par \par

## 2022-11-07 NOTE — PHYSICAL EXAM
[General Appearance - Alert] : alert [General Appearance - In No Acute Distress] : in no acute distress [Oriented To Time, Place, And Person] : oriented to person, place, and time [Impaired Insight] : insight and judgment were intact [Affect] : the affect was normal [Person] : oriented to person [Place] : oriented to place [Time] : oriented to time [Visual Intact] : visual attention was ~T not ~L decreased [Naming Objects] : no difficulty naming common objects [Writing A Sentence] : no difficulty writing a sentence [Fluency] : fluency intact [Comprehension] : comprehension intact [Reading] : reading intact [Past History] : adequate knowledge of personal past history [Cranial Nerves Optic (II)] : visual acuity intact bilaterally,  visual fields full to confrontation, pupils equal round and reactive to light [Cranial Nerves Oculomotor (III)] : extraocular motion intact [Cranial Nerves Trigeminal (V)] : facial sensation intact symmetrically [Cranial Nerves Facial (VII)] : face symmetrical [Cranial Nerves Vestibulocochlear (VIII)] : hearing was intact bilaterally [Cranial Nerves Glossopharyngeal (IX)] : tongue and palate midline [Cranial Nerves Accessory (XI - Cranial And Spinal)] : head turning and shoulder shrug symmetric [Cranial Nerves Hypoglossal (XII)] : there was no tongue deviation with protrusion [Motor Tone] : muscle tone was normal in all four extremities [Motor Strength] : muscle strength was normal in all four extremities [No Muscle Atrophy] : normal bulk in all four extremities [Motor Handedness Right-Handed] : the patient is right hand dominant [Paresis Pronator Drift Right-Sided] : no pronator drift on the right [Paresis Pronator Drift Left-Sided] : no pronator drift on the left [Sensation Tactile Decrease] : light touch was intact [Sensation Pain / Temperature Decrease] : pain and temperature was intact [Abnormal Walk] : normal gait [Balance] : balance was intact [Past-pointing] : there was no past-pointing [Tremor] : no tremor present [Dysdiadochokinesia Bilaterally] : not present [Coordination - Dysmetria Impaired Finger-to-Nose Bilateral] : not present [2+] : Ankle jerk left 2+ [Neck Appearance] : the appearance of the neck was normal [] : the neck was supple [No Spinal Tenderness] : no spinal tenderness [FreeTextEntry1] : No Tinel's sign, no Phalen signs at the wrist. [Skin Color & Pigmentation] : normal skin color and pigmentation [Skin Turgor] : normal skin turgor

## 2022-12-09 ENCOUNTER — APPOINTMENT (OUTPATIENT)
Dept: DERMATOLOGY | Facility: CLINIC | Age: 62
End: 2022-12-09

## 2022-12-09 PROCEDURE — 99214 OFFICE O/P EST MOD 30 MIN: CPT

## 2022-12-09 RX ORDER — MUPIROCIN 20 MG/G
2 OINTMENT TOPICAL
Qty: 22 | Refills: 0 | Status: ACTIVE | COMMUNITY
Start: 2022-11-09

## 2022-12-09 RX ORDER — ECONAZOLE NITRATE 10 MG/G
1 CREAM TOPICAL
Qty: 90 | Refills: 0 | Status: ACTIVE | COMMUNITY
Start: 2022-07-01

## 2022-12-09 NOTE — ASSESSMENT
[FreeTextEntry1] : ICD/Intertrigo; \par \par Therapeutic options and their risks and benefits; along with multiple diagnostic possibilities were discussed at length; risks and benefits of further study were discussed;\par \par pigment changes indicate prior episodes in past; \par \par fluticasone ointment qd x 7-10d; \par vaseline maintenance\par f/u prn if fails to improve; \par Continue regular exams;

## 2022-12-09 NOTE — PHYSICAL EXAM
[FreeTextEntry3] : Erythematous scaling patches with inflammation \par L > R abdominal fold;  some in center; \par with significant pigmentation of area

## 2022-12-09 NOTE — HISTORY OF PRESENT ILLNESS
[de-identified] : Pt. c/o rash on abdomen;  present approx 1 week;  burning, itching; \par used OTCs;

## 2022-12-21 ENCOUNTER — APPOINTMENT (OUTPATIENT)
Dept: GYNECOLOGIC ONCOLOGY | Facility: CLINIC | Age: 62
End: 2022-12-21

## 2022-12-21 PROCEDURE — 99212 OFFICE O/P EST SF 10 MIN: CPT

## 2022-12-21 NOTE — REASON FOR VISIT
[FreeTextEntry1] : Raj Location \par [facility] \par  \par [address]\par [place] [address]\par [phone]\par

## 2022-12-21 NOTE — HISTORY OF PRESENT ILLNESS
[FreeTextEntry1] : Pt is a 63 yo with Stage Ia endometroid adenocarcinoma s/p TRH, BSO, SLND on 05/19/2020, superficial myometrial invasion, no LVI, negative lymph nodes, Grade 1. No risk factors and no recommendation for adjuvant treatment. Pt presents to office for a six month SVL visit. \par \par Interval history:\par She is taking her Vitamin D but reports not feeling any different. She has no new gynecologic issues, no changes in medical/surgical history.\par  \par Health maintenance: \par Mammo 06/30/22:.IMPRESSION:\par No mammographic evidence of malignancy. BI-RADS 2 - Benign Finding(s\par Dexa 03/2021: Impression:  Patient has normal bone mass.\par Colonoscopy:  due 1/2023 scheduled appointment\par Pap and Hpv 06/08/22: Final Diagnosis\par NEGATIVE FOR INTRAEPITHELIAL LESION OR MALIGNANCY. Hpv rl not detected \par Vaccines: Reviewed, covid booster, and influenza 10/22\par

## 2022-12-21 NOTE — ASSESSMENT
[FreeTextEntry1] : Pt is a 63 yo with Stage Ia endometroid adenocarcinoma s/p TRH, BSO, SLND on 05/19/2020, superficial myometrial invasion, no LVI, negative lymph nodes, Grade 1. \par \par She has no evidence of disease. Continue routine surveillance and preventative care.

## 2022-12-21 NOTE — END OF VISIT
[FreeTextEntry3] : RTC in 6 months for surveillance [FreeTextEntry2] : Emily Bhakta MA was present the entire duration of the patient interaction and gynecological exam.\par

## 2023-01-31 ENCOUNTER — APPOINTMENT (OUTPATIENT)
Dept: NEUROLOGY | Facility: CLINIC | Age: 63
End: 2023-01-31
Payer: COMMERCIAL

## 2023-01-31 PROCEDURE — 99213 OFFICE O/P EST LOW 20 MIN: CPT

## 2023-01-31 PROCEDURE — 95912 NRV CNDJ TEST 11-12 STUDIES: CPT

## 2023-01-31 NOTE — DISCUSSION/SUMMARY
[FreeTextEntry1] : 62-year-old woman, with mild bilateral carpal tunnel syndrome, advised to wear nighttime wrist brace.\par History of bilateral muscle lower extremity cramps, advised magnesium citrate 2 to 400 mg at bedtime.\par Can also try gabapentin 300 mg at bedtime.\par Patient will call for prescription if she wants to try.\par Return to office, 3 to 4 months.

## 2023-01-31 NOTE — PHYSICAL EXAM
[General Appearance - Alert] : alert [General Appearance - In No Acute Distress] : in no acute distress [Person] : oriented to person [Place] : oriented to place [Time] : oriented to time [Cranial Nerves Optic (II)] : visual acuity intact bilaterally,  visual fields full to confrontation, pupils equal round and reactive to light [Cranial Nerves Oculomotor (III)] : extraocular motion intact [Cranial Nerves Trigeminal (V)] : facial sensation intact symmetrically [Cranial Nerves Facial (VII)] : face symmetrical [Cranial Nerves Vestibulocochlear (VIII)] : hearing was intact bilaterally [Cranial Nerves Accessory (XI - Cranial And Spinal)] : head turning and shoulder shrug symmetric [Cranial Nerves Hypoglossal (XII)] : there was no tongue deviation with protrusion [Motor Tone] : muscle tone was normal in all four extremities [Motor Strength] : muscle strength was normal in all four extremities [No Muscle Atrophy] : normal bulk in all four extremities [Motor Handedness Right-Handed] : the patient is right hand dominant [Sensation Tactile Decrease] : light touch was intact [Sensation Pain / Temperature Decrease] : pain and temperature was intact [Abnormal Walk] : normal gait [Balance] : balance was intact [2+] : Ankle jerk left 2+

## 2023-01-31 NOTE — REVIEW OF SYSTEMS
[Facial Weakness] : no facial weakness [Arm Weakness] : no arm weakness [Hand Weakness] : no hand weakness [Leg Weakness] : no leg weakness [Numbness] : numbness [Tingling] : tingling [Difficulty Walking] : no difficulty walking [Negative] : Heme/Lymph

## 2023-01-31 NOTE — PROCEDURE
[FreeTextEntry1] : Nerve conduction study of both upper extremities and legs demonstrates evidence of mild sensory median nerve entrapment neuropathy at the wrist, consistent with carpal tunnel syndrome.  No evidence of a peripheral neuropathy at this time.

## 2023-01-31 NOTE — HISTORY OF PRESENT ILLNESS
[FreeTextEntry1] : 62-year-old woman right-handed complaining of numbness, tingling feelings of the hands, worse at night, also noted a lot of cramps in the legs, usually in the early morning, will wake her up from sleep, can be very severe.\par Prior history of bilateral carpal tunnel syndrome, had bilateral carpal tunnel release surgery in the past.\par Denies any weakness of the hands or legs, no trouble walking, occasional back pain but not significant.  Does not prevent her from doing any functional activities.\par

## 2023-02-08 ENCOUNTER — APPOINTMENT (OUTPATIENT)
Dept: DERMATOLOGY | Facility: CLINIC | Age: 63
End: 2023-02-08
Payer: COMMERCIAL

## 2023-02-08 DIAGNOSIS — L91.0 HYPERTROPHIC SCAR: ICD-10-CM

## 2023-02-08 PROCEDURE — 99213 OFFICE O/P EST LOW 20 MIN: CPT

## 2023-02-08 NOTE — ASSESSMENT
[FreeTextEntry1] : Complete skin examination is negative for malignancy; Multiple new concerns were addressed and discussed.\par Therapeutic options and their risks and benefits; along with multiple diagnostic possibilities were discussed at length;\par risks and benefits of skin biopsy and/or other further study were discussed;\par \par Continue regular exams; \par Follow up for TBSE in 1 year - recent BCC 2/2021- sooner prn\par \par hypertrophic D&C scar;  responded well to ILK; \par \par

## 2023-02-08 NOTE — HISTORY OF PRESENT ILLNESS
[de-identified] : Pt. presents for skin check;\par c/o few spots of concern; \par Severity:  mild  \par Modifying factors:  none\par Associated symptoms:  none\par Context:  no association with activity \par Recent BCC R upper back 2/2021

## 2023-02-08 NOTE — PHYSICAL EXAM
[Full Body Skin Exam Performed] : performed [FreeTextEntry3] : Skin examination performed of the face, neck, trunk, arms, legs; \par The patient is well, alert and oriented, pleasant and cooperative.\par Eyelids, conjunctivae, oral mucosa, digits and nails all normal.  \par No cervical adenopathy.\par \par Normal findings include:\par \par Seborrheic keratoses- multiple\par Angiomas\par + lentigines and solar damage are present in sun exposed areas; face\par larger, darker lentigines on L cheek; \par \par healed D&C site;  R upper back; \par \par No lesions suspicious for malignancy. \par

## 2023-03-22 ENCOUNTER — APPOINTMENT (OUTPATIENT)
Dept: OBGYN | Facility: CLINIC | Age: 63
End: 2023-03-22
Payer: COMMERCIAL

## 2023-03-22 VITALS
BODY MASS INDEX: 34.66 KG/M2 | HEIGHT: 64 IN | DIASTOLIC BLOOD PRESSURE: 75 MMHG | SYSTOLIC BLOOD PRESSURE: 120 MMHG | WEIGHT: 203 LBS

## 2023-03-22 DIAGNOSIS — Z01.419 ENCOUNTER FOR GYNECOLOGICAL EXAMINATION (GENERAL) (ROUTINE) W/OUT ABNORMAL FINDINGS: ICD-10-CM

## 2023-03-22 DIAGNOSIS — N95.2 POSTMENOPAUSAL ATROPHIC VAGINITIS: ICD-10-CM

## 2023-03-22 DIAGNOSIS — Z85.42 PERSONAL HISTORY OF MALIGNANT NEOPLASM OF OTHER PARTS OF UTERUS: ICD-10-CM

## 2023-03-22 PROCEDURE — 99396 PREV VISIT EST AGE 40-64: CPT

## 2023-03-22 PROCEDURE — 82270 OCCULT BLOOD FECES: CPT

## 2023-03-22 NOTE — HISTORY OF PRESENT ILLNESS
[FreeTextEntry1] : Patient is a 62-year-old female who presents for a routine annual gynecologic examination.  Patient with a history of endometrial cancer status post surgery in 2020.  Patient's last mammogram was June 2022 and last bone density was March 2021

## 2023-03-27 ENCOUNTER — APPOINTMENT (OUTPATIENT)
Dept: RHEUMATOLOGY | Facility: CLINIC | Age: 63
End: 2023-03-27

## 2023-03-27 LAB — CYTOLOGY CVX/VAG DOC THIN PREP: NORMAL

## 2023-05-11 ENCOUNTER — OUTPATIENT (OUTPATIENT)
Dept: OUTPATIENT SERVICES | Facility: HOSPITAL | Age: 63
LOS: 1 days | End: 2023-05-11
Payer: COMMERCIAL

## 2023-05-11 VITALS
WEIGHT: 190.92 LBS | HEIGHT: 64 IN | HEART RATE: 79 BPM | SYSTOLIC BLOOD PRESSURE: 109 MMHG | RESPIRATION RATE: 16 BRPM | TEMPERATURE: 99 F | DIASTOLIC BLOOD PRESSURE: 67 MMHG | OXYGEN SATURATION: 99 %

## 2023-05-11 DIAGNOSIS — M50.21 OTHER CERVICAL DISC DISPLACEMENT, HIGH CERVICAL REGION: ICD-10-CM

## 2023-05-11 DIAGNOSIS — Z98.890 OTHER SPECIFIED POSTPROCEDURAL STATES: Chronic | ICD-10-CM

## 2023-05-11 DIAGNOSIS — Z90.710 ACQUIRED ABSENCE OF BOTH CERVIX AND UTERUS: Chronic | ICD-10-CM

## 2023-05-11 DIAGNOSIS — C44.91 BASAL CELL CARCINOMA OF SKIN, UNSPECIFIED: Chronic | ICD-10-CM

## 2023-05-11 DIAGNOSIS — Z01.818 ENCOUNTER FOR OTHER PREPROCEDURAL EXAMINATION: ICD-10-CM

## 2023-05-11 LAB
ALBUMIN SERPL ELPH-MCNC: 3.8 G/DL — SIGNIFICANT CHANGE UP (ref 3.3–5)
ALP SERPL-CCNC: 74 U/L — SIGNIFICANT CHANGE UP (ref 40–120)
ALT FLD-CCNC: 28 U/L — SIGNIFICANT CHANGE UP (ref 12–78)
ANION GAP SERPL CALC-SCNC: 3 MMOL/L — LOW (ref 5–17)
APPEARANCE UR: ABNORMAL
APTT BLD: 26.9 SEC — LOW (ref 27.5–35.5)
AST SERPL-CCNC: 21 U/L — SIGNIFICANT CHANGE UP (ref 15–37)
BACTERIA # UR AUTO: ABNORMAL
BASOPHILS # BLD AUTO: 0.03 K/UL — SIGNIFICANT CHANGE UP (ref 0–0.2)
BASOPHILS NFR BLD AUTO: 0.4 % — SIGNIFICANT CHANGE UP (ref 0–2)
BILIRUB SERPL-MCNC: 0.6 MG/DL — SIGNIFICANT CHANGE UP (ref 0.2–1.2)
BILIRUB UR-MCNC: NEGATIVE — SIGNIFICANT CHANGE UP
BLD GP AB SCN SERPL QL: SIGNIFICANT CHANGE UP
BUN SERPL-MCNC: 19 MG/DL — SIGNIFICANT CHANGE UP (ref 7–23)
CALCIUM SERPL-MCNC: 10.4 MG/DL — HIGH (ref 8.5–10.1)
CHLORIDE SERPL-SCNC: 109 MMOL/L — HIGH (ref 96–108)
CO2 SERPL-SCNC: 31 MMOL/L — SIGNIFICANT CHANGE UP (ref 22–31)
COLOR SPEC: YELLOW — SIGNIFICANT CHANGE UP
CREAT SERPL-MCNC: 1.12 MG/DL — SIGNIFICANT CHANGE UP (ref 0.5–1.3)
DIFF PNL FLD: ABNORMAL
EGFR: 56 ML/MIN/1.73M2 — LOW
EOSINOPHIL # BLD AUTO: 0.04 K/UL — SIGNIFICANT CHANGE UP (ref 0–0.5)
EOSINOPHIL NFR BLD AUTO: 0.5 % — SIGNIFICANT CHANGE UP (ref 0–6)
GLUCOSE SERPL-MCNC: 93 MG/DL — SIGNIFICANT CHANGE UP (ref 70–99)
GLUCOSE UR QL: NEGATIVE — SIGNIFICANT CHANGE UP
HCT VFR BLD CALC: 38.4 % — SIGNIFICANT CHANGE UP (ref 34.5–45)
HGB BLD-MCNC: 12.6 G/DL — SIGNIFICANT CHANGE UP (ref 11.5–15.5)
IMM GRANULOCYTES NFR BLD AUTO: 0.3 % — SIGNIFICANT CHANGE UP (ref 0–0.9)
INR BLD: 1.16 RATIO — SIGNIFICANT CHANGE UP (ref 0.88–1.16)
KETONES UR-MCNC: NEGATIVE — SIGNIFICANT CHANGE UP
LEUKOCYTE ESTERASE UR-ACNC: ABNORMAL
LYMPHOCYTES # BLD AUTO: 2.04 K/UL — SIGNIFICANT CHANGE UP (ref 1–3.3)
LYMPHOCYTES # BLD AUTO: 27.5 % — SIGNIFICANT CHANGE UP (ref 13–44)
MCHC RBC-ENTMCNC: 30.6 PG — SIGNIFICANT CHANGE UP (ref 27–34)
MCHC RBC-ENTMCNC: 32.8 GM/DL — SIGNIFICANT CHANGE UP (ref 32–36)
MCV RBC AUTO: 93.2 FL — SIGNIFICANT CHANGE UP (ref 80–100)
MONOCYTES # BLD AUTO: 0.36 K/UL — SIGNIFICANT CHANGE UP (ref 0–0.9)
MONOCYTES NFR BLD AUTO: 4.9 % — SIGNIFICANT CHANGE UP (ref 2–14)
NEUTROPHILS # BLD AUTO: 4.92 K/UL — SIGNIFICANT CHANGE UP (ref 1.8–7.4)
NEUTROPHILS NFR BLD AUTO: 66.4 % — SIGNIFICANT CHANGE UP (ref 43–77)
NITRITE UR-MCNC: NEGATIVE — SIGNIFICANT CHANGE UP
PH UR: 5 — SIGNIFICANT CHANGE UP (ref 5–8)
PLATELET # BLD AUTO: 268 K/UL — SIGNIFICANT CHANGE UP (ref 150–400)
POTASSIUM SERPL-MCNC: 4.2 MMOL/L — SIGNIFICANT CHANGE UP (ref 3.5–5.3)
POTASSIUM SERPL-SCNC: 4.2 MMOL/L — SIGNIFICANT CHANGE UP (ref 3.5–5.3)
PROT SERPL-MCNC: 7.9 GM/DL — SIGNIFICANT CHANGE UP (ref 6–8.3)
PROT UR-MCNC: NEGATIVE — SIGNIFICANT CHANGE UP
PROTHROM AB SERPL-ACNC: 13.5 SEC — HIGH (ref 10.5–13.4)
RBC # BLD: 4.12 M/UL — SIGNIFICANT CHANGE UP (ref 3.8–5.2)
RBC # FLD: 12.5 % — SIGNIFICANT CHANGE UP (ref 10.3–14.5)
RBC CASTS # UR COMP ASSIST: ABNORMAL /HPF (ref 0–4)
SODIUM SERPL-SCNC: 143 MMOL/L — SIGNIFICANT CHANGE UP (ref 135–145)
SP GR SPEC: 1.02 — SIGNIFICANT CHANGE UP (ref 1.01–1.02)
UROBILINOGEN FLD QL: 1
WBC # BLD: 7.41 K/UL — SIGNIFICANT CHANGE UP (ref 3.8–10.5)
WBC # FLD AUTO: 7.41 K/UL — SIGNIFICANT CHANGE UP (ref 3.8–10.5)
WBC UR QL: ABNORMAL /HPF (ref 0–5)

## 2023-05-11 PROCEDURE — 93010 ELECTROCARDIOGRAM REPORT: CPT

## 2023-05-11 PROCEDURE — 86850 RBC ANTIBODY SCREEN: CPT

## 2023-05-11 PROCEDURE — 85025 COMPLETE CBC W/AUTO DIFF WBC: CPT

## 2023-05-11 PROCEDURE — 85730 THROMBOPLASTIN TIME PARTIAL: CPT

## 2023-05-11 PROCEDURE — 80053 COMPREHEN METABOLIC PANEL: CPT

## 2023-05-11 PROCEDURE — 87641 MR-STAPH DNA AMP PROBE: CPT

## 2023-05-11 PROCEDURE — 83036 HEMOGLOBIN GLYCOSYLATED A1C: CPT

## 2023-05-11 PROCEDURE — 85610 PROTHROMBIN TIME: CPT

## 2023-05-11 PROCEDURE — 93005 ELECTROCARDIOGRAM TRACING: CPT

## 2023-05-11 PROCEDURE — 86901 BLOOD TYPING SEROLOGIC RH(D): CPT

## 2023-05-11 PROCEDURE — 81001 URINALYSIS AUTO W/SCOPE: CPT

## 2023-05-11 PROCEDURE — 87640 STAPH A DNA AMP PROBE: CPT

## 2023-05-11 PROCEDURE — 71046 X-RAY EXAM CHEST 2 VIEWS: CPT

## 2023-05-11 PROCEDURE — 71046 X-RAY EXAM CHEST 2 VIEWS: CPT | Mod: 26

## 2023-05-11 PROCEDURE — 87086 URINE CULTURE/COLONY COUNT: CPT

## 2023-05-11 PROCEDURE — 36415 COLL VENOUS BLD VENIPUNCTURE: CPT

## 2023-05-11 PROCEDURE — 86900 BLOOD TYPING SEROLOGIC ABO: CPT

## 2023-05-11 PROCEDURE — 99214 OFFICE O/P EST MOD 30 MIN: CPT | Mod: 25

## 2023-05-11 NOTE — H&P PST ADULT - NSICDXPASTMEDICALHX_GEN_ALL_CORE_FT
PAST MEDICAL HISTORY:  Cervical spinal stenosis     Diabetes mellitus     DVT, lower extremity approx 2010   tx with coumadin 6 months    Herniated cervical disc     High cholesterol     Sciatica yrs ago    Skin cancer, basal cell     Uterine cancer feb 2020    Vasovagal episode with blood draw

## 2023-05-11 NOTE — H&P PST ADULT - HISTORY OF PRESENT ILLNESS
59 yr old female interviewed on phone for history. recently diagnosed with uterine cancer  2019 had postmenopausal bleeding . LMP   Pap done 2018 normal at that time  repeated 2020 with hysteroscopy and biopsy  .  pt states she was scheduled for hysterectomy and bladder sling on 2020 with Dr. finch and Dr. hi  but was cancelled due to covid  19 pandemic . (pt denies any illness) . 62 year old female diagnosed with cervical herniated disc, cervical spine stenosis; Pt started complaining of pain Fall 2022 in the mid, lower back and coccyx; Pt said pain worsens when she is on her feet and diminishes at rest; she is unable to describe pain type; denies numbness, tingling and weakness of upper extremities; she presents to PST for planned C4-C6 ACDF

## 2023-05-11 NOTE — H&P PST ADULT - NSICDXPASTSURGICALHX_GEN_ALL_CORE_FT
PAST SURGICAL HISTORY:  H/O carpal tunnel repair     H/O: hysterectomy     History of basal cell carcinoma excision     History of suburethral sling procedure

## 2023-05-11 NOTE — H&P PST ADULT - ASSESSMENT
Plan:  1. PST instructions given ; NPO status/  instructions to be given by ASU   2. Pt instructed to take following meds on day of surgery:   3. Pt instructed to take routine evening medications unless indicated   4. Stop NSAIDS ( Aspirin Alev Motrin Mobic Diclofenac), herbal supplements , MVI , Vitamin fish oil 7 days prior to surgery  unless   directed by surgeon or cardiologist;   5. Medical Optimization  with    6. EZ wash instructions given & mupirocin instructions given  7. Labs EKG CXR as per surgeon request   8. Pt denies covid symptoms shortness of breath fever cough   9. Spine booklet given to patient       CAPRINI SCORE [CLOT]    AGE RELATED RISK FACTORS                                                       MOBILITY RELATED FACTORS  [ ] Age 41-60 years                                            (1 Point)                  [ ] Bed rest                                                        (1 Point)  [ ] Age: 61-74 years                                           (2 Points)                 [ ] Plaster cast                                                   (2 Points)  [ ] Age= 75 years                                              (3 Points)                 [ ] Bed bound for more than 72 hours                 (2 Points)    DISEASE RELATED RISK FACTORS                                               GENDER SPECIFIC FACTORS  [ ] Edema in the lower extremities                       (1 Point)                  [ ] Pregnancy                                                     (1 Point)  [ ] Varicose veins                                               (1 Point)                  [ ] Post-partum < 6 weeks                                   (1 Point)             [ ] BMI > 25 Kg/m2                                            (1 Point)                  [ ] Hormonal therapy  or oral contraception          (1 Point)                 [ ] Sepsis (in the previous month)                        (1 Point)                  [ ] History of pregnancy complications                 (1 point)  [ ] Pneumonia or serious lung disease                                               [ ] Unexplained or recurrent                     (1 Point)           (in the previous month)                               (1 Point)  [ ] Abnormal pulmonary function test                     (1 Point)                 SURGERY RELATED RISK FACTORS  [ ] Acute myocardial infarction                              (1 Point)                 [ ]  Section                                             (1 Point)  [ ] Congestive heart failure (in the previous month)  (1 Point)               [ ] Minor surgery                                                  (1 Point)   [ ] Inflammatory bowel disease                             (1 Point)                 [ ] Arthroscopic surgery                                        (2 Points)  [ ] Central venous access                                      (2 Points)                [ ] General surgery lasting more than 45 minutes   (2 Points)       [ ] Stroke (in the previous month)                          (5 Points)               [ ] Elective arthroplasty                                         (5 Points)            ( ) presents and past malignancy                           (2 points)                                                                                                         HEMATOLOGY RELATED FACTORS                                                 TRAUMA RELATED RISK FACTORS  [ ] Prior episodes of VTE                                     (3 Points)                 [ ] Fracture of the hip, pelvis, or leg                       (5 Points)  [ ] Positive family history for VTE                         (3 Points)                 [ ] Acute spinal cord injury (in the previous month)  (5 Points)  [ ] Prothrombin 83716 A                                     (3 Points)                 [ ] Paralysis  (less than 1 month)                             (5 Points)  [ ] Factor V Leiden                                             (3 Points)                  [ ] Multiple Trauma within 1 month                        (5 Points)  [ ] Lupus anticoagulants                                     (3 Points)                                                           [ ] Anticardiolipin antibodies                               (3 Points)                                                       [ ] High homocysteine in the blood                      (3 Points)                                             [ ] Other congenital or acquired thrombophilia      (3 Points)                                                [ ] Heparin induced thrombocytopenia                  (3 Points)                                          Total Score [          ] 62 year old female diagnosed with cervical herniated disc, cervical spine stenosis; Pt started complaining of pain Fall  in the mid, lower back and coccyx; Pt said pain worsens when she is on her feet and diminishes at rest; she is unable to describe pain type; denies numbness, tingling and weakness of upper extremities; she presents to PST for planned C4-C6 ACDF       Plan:  1. PST instructions given ; NPO status/  instructions to be given by ASU   2. Pt instructed to take following meds on day of surgery: none   3. Pt instructed to take routine evening medications unless indicated   4. Stop NSAIDS ( Aspirin Alev Motrin Mobic Diclofenac), herbal supplements , MVI , Vitamin fish oil 7 days prior to surgery  unless   directed by surgeon or cardiologist;   5. Medical Optimization  with Dr Lockhart   6. EZ wash instructions given & mupirocin instructions given  7. Labs EKG CXR as per surgeon request   8. Pt denies covid symptoms shortness of breath fever cough   9. Spine booklet given to patient       MELBAI SCORE [CLOT]    AGE RELATED RISK FACTORS                                                       MOBILITY RELATED FACTORS  [x ] Age 41-60 years                                            (1 Point)                  [ ] Bed rest                                                        (1 Point)  [ ] Age: 61-74 years                                           (2 Points)                 [ ] Plaster cast                                                   (2 Points)  [ ] Age= 75 years                                              (3 Points)                 [ ] Bed bound for more than 72 hours                 (2 Points)    DISEASE RELATED RISK FACTORS                                               GENDER SPECIFIC FACTORS  [ ] Edema in the lower extremities                       (1 Point)                  [ ] Pregnancy                                                     (1 Point)  [ ] Varicose veins                                               (1 Point)                  [ ] Post-partum < 6 weeks                                   (1 Point)             [x ] BMI > 25 Kg/m2                                            (1 Point)                  [ ] Hormonal therapy  or oral contraception          (1 Point)                 [ ] Sepsis (in the previous month)                        (1 Point)                  [ ] History of pregnancy complications                 (1 point)  [ ] Pneumonia or serious lung disease                                               [ ] Unexplained or recurrent                     (1 Point)           (in the previous month)                               (1 Point)  [ ] Abnormal pulmonary function test                     (1 Point)                 SURGERY RELATED RISK FACTORS  [ ] Acute myocardial infarction                              (1 Point)                 [ ]  Section                                             (1 Point)  [ ] Congestive heart failure (in the previous month)  (1 Point)               [ ] Minor surgery                                                  (1 Point)   [ ] Inflammatory bowel disease                             (1 Point)                 [ ] Arthroscopic surgery                                        (2 Points)  [ ] Central venous access                                      (2 Points)                [ x] General surgery lasting more than 45 minutes   (2 Points)       [ ] Stroke (in the previous month)                          (5 Points)               [ ] Elective arthroplasty                                         (5 Points)            (x ) presents and past malignancy                           (2 points)                                                                                                         HEMATOLOGY RELATED FACTORS                                                 TRAUMA RELATED RISK FACTORS  [x ] Prior episodes of VTE                                     (3 Points)                 [ ] Fracture of the hip, pelvis, or leg                       (5 Points)  [ ] Positive family history for VTE                         (3 Points)                 [ ] Acute spinal cord injury (in the previous month)  (5 Points)  [ ] Prothrombin 50695 A                                     (3 Points)                 [ ] Paralysis  (less than 1 month)                             (5 Points)  [ ] Factor V Leiden                                             (3 Points)                  [ ] Multiple Trauma within 1 month                        (5 Points)  [ ] Lupus anticoagulants                                     (3 Points)                                                           [ ] Anticardiolipin antibodies                               (3 Points)                                                       [ ] High homocysteine in the blood                      (3 Points)                                             [ ] Other congenital or acquired thrombophilia      (3 Points)                                                [ ] Heparin induced thrombocytopenia                  (3 Points)                                          Total Score [    9      ]    The Caprini score indicates that this patient is at high risk for a VTE event (score 6 or greater). Surgical patients in this group will benefit from both pharmacologic prophylaxis and intermittent compression devices.  The surgical team will determine the balance between VTE risk and bleeding risk, and other clinical considerations

## 2023-05-12 DIAGNOSIS — M50.21 OTHER CERVICAL DISC DISPLACEMENT, HIGH CERVICAL REGION: ICD-10-CM

## 2023-05-12 DIAGNOSIS — Z01.818 ENCOUNTER FOR OTHER PREPROCEDURAL EXAMINATION: ICD-10-CM

## 2023-05-12 LAB
A1C WITH ESTIMATED AVERAGE GLUCOSE RESULT: 5.5 % — SIGNIFICANT CHANGE UP (ref 4–5.6)
ESTIMATED AVERAGE GLUCOSE: 111 MG/DL — SIGNIFICANT CHANGE UP (ref 68–114)
MRSA PCR RESULT.: SIGNIFICANT CHANGE UP
S AUREUS DNA NOSE QL NAA+PROBE: SIGNIFICANT CHANGE UP

## 2023-05-13 LAB
CULTURE RESULTS: SIGNIFICANT CHANGE UP
SPECIMEN SOURCE: SIGNIFICANT CHANGE UP

## 2023-05-19 ENCOUNTER — INPATIENT (INPATIENT)
Facility: HOSPITAL | Age: 63
LOS: 2 days | Discharge: HOME | DRG: 472 | End: 2023-05-22
Attending: ORTHOPAEDIC SURGERY | Admitting: ORTHOPAEDIC SURGERY
Payer: COMMERCIAL

## 2023-05-19 VITALS
SYSTOLIC BLOOD PRESSURE: 135 MMHG | HEIGHT: 64 IN | HEART RATE: 67 BPM | TEMPERATURE: 98 F | RESPIRATION RATE: 15 BRPM | OXYGEN SATURATION: 99 % | DIASTOLIC BLOOD PRESSURE: 84 MMHG | WEIGHT: 190.92 LBS
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
## 2023-05-19 DIAGNOSIS — G89.29 OTHER CHRONIC PAIN: ICD-10-CM

## 2023-05-19 DIAGNOSIS — M50.21 OTHER CERVICAL DISC DISPLACEMENT, HIGH CERVICAL REGION: ICD-10-CM

## 2023-05-19 DIAGNOSIS — Z98.890 OTHER SPECIFIED POSTPROCEDURAL STATES: Chronic | ICD-10-CM

## 2023-05-19 DIAGNOSIS — Z90.710 ACQUIRED ABSENCE OF BOTH CERVIX AND UTERUS: Chronic | ICD-10-CM

## 2023-05-19 LAB
GLUCOSE BLDC GLUCOMTR-MCNC: 103 MG/DL — HIGH (ref 70–99)
GLUCOSE BLDC GLUCOMTR-MCNC: 126 MG/DL — HIGH (ref 70–99)
GLUCOSE BLDC GLUCOMTR-MCNC: 89 MG/DL — SIGNIFICANT CHANGE UP (ref 70–99)
GLUCOSE BLDC GLUCOMTR-MCNC: 99 MG/DL — SIGNIFICANT CHANGE UP (ref 70–99)
GLUCOSE SERPL-MCNC: 130 MG/DL — HIGH (ref 70–99)

## 2023-05-19 PROCEDURE — 97161 PT EVAL LOW COMPLEX 20 MIN: CPT | Mod: GP

## 2023-05-19 PROCEDURE — 88304 TISSUE EXAM BY PATHOLOGIST: CPT

## 2023-05-19 PROCEDURE — 36415 COLL VENOUS BLD VENIPUNCTURE: CPT

## 2023-05-19 PROCEDURE — 76000 FLUOROSCOPY <1 HR PHYS/QHP: CPT

## 2023-05-19 PROCEDURE — 93005 ELECTROCARDIOGRAM TRACING: CPT

## 2023-05-19 PROCEDURE — 97116 GAIT TRAINING THERAPY: CPT | Mod: GP

## 2023-05-19 PROCEDURE — 85025 COMPLETE CBC W/AUTO DIFF WBC: CPT

## 2023-05-19 PROCEDURE — C1889: CPT

## 2023-05-19 PROCEDURE — C1713: CPT

## 2023-05-19 PROCEDURE — 88304 TISSUE EXAM BY PATHOLOGIST: CPT | Mod: 26

## 2023-05-19 PROCEDURE — C9399: CPT

## 2023-05-19 PROCEDURE — 84484 ASSAY OF TROPONIN QUANT: CPT

## 2023-05-19 PROCEDURE — 80053 COMPREHEN METABOLIC PANEL: CPT

## 2023-05-19 PROCEDURE — 83735 ASSAY OF MAGNESIUM: CPT

## 2023-05-19 PROCEDURE — 84100 ASSAY OF PHOSPHORUS: CPT

## 2023-05-19 PROCEDURE — 80048 BASIC METABOLIC PNL TOTAL CA: CPT

## 2023-05-19 PROCEDURE — 82962 GLUCOSE BLOOD TEST: CPT

## 2023-05-19 PROCEDURE — 82947 ASSAY GLUCOSE BLOOD QUANT: CPT

## 2023-05-19 RX ORDER — ONDANSETRON 8 MG/1
4 TABLET, FILM COATED ORAL EVERY 8 HOURS
Refills: 0 | Status: DISCONTINUED | OUTPATIENT
Start: 2023-05-19 | End: 2023-05-21

## 2023-05-19 RX ORDER — TRANEXAMIC ACID 100 MG/ML
500 INJECTION, SOLUTION INTRAVENOUS ONCE
Refills: 0 | Status: DISCONTINUED | OUTPATIENT
Start: 2023-05-19 | End: 2023-05-19

## 2023-05-19 RX ORDER — OXYCODONE HYDROCHLORIDE 5 MG/1
10 TABLET ORAL EVERY 4 HOURS
Refills: 0 | Status: DISCONTINUED | OUTPATIENT
Start: 2023-05-19 | End: 2023-05-22

## 2023-05-19 RX ORDER — ROSUVASTATIN CALCIUM 5 MG/1
1 TABLET ORAL
Qty: 0 | Refills: 0 | DISCHARGE

## 2023-05-19 RX ORDER — GABAPENTIN 400 MG/1
300 CAPSULE ORAL ONCE
Refills: 0 | Status: COMPLETED | OUTPATIENT
Start: 2023-05-19 | End: 2023-05-19

## 2023-05-19 RX ORDER — PANTOPRAZOLE SODIUM 20 MG/1
40 TABLET, DELAYED RELEASE ORAL
Refills: 0 | Status: DISCONTINUED | OUTPATIENT
Start: 2023-05-19 | End: 2023-05-22

## 2023-05-19 RX ORDER — ONDANSETRON 8 MG/1
4 TABLET, FILM COATED ORAL ONCE
Refills: 0 | Status: DISCONTINUED | OUTPATIENT
Start: 2023-05-19 | End: 2023-05-19

## 2023-05-19 RX ORDER — CHOLECALCIFEROL (VITAMIN D3) 125 MCG
1 CAPSULE ORAL
Refills: 0 | DISCHARGE

## 2023-05-19 RX ORDER — TRANEXAMIC ACID 100 MG/ML
1000 INJECTION, SOLUTION INTRAVENOUS ONCE
Refills: 0 | Status: DISCONTINUED | OUTPATIENT
Start: 2023-05-19 | End: 2023-05-19

## 2023-05-19 RX ORDER — DEXTROSE 50 % IN WATER 50 %
15 SYRINGE (ML) INTRAVENOUS ONCE
Refills: 0 | Status: DISCONTINUED | OUTPATIENT
Start: 2023-05-19 | End: 2023-05-22

## 2023-05-19 RX ORDER — CEFAZOLIN SODIUM 1 G
2000 VIAL (EA) INJECTION EVERY 8 HOURS
Refills: 0 | Status: COMPLETED | OUTPATIENT
Start: 2023-05-19 | End: 2023-05-19

## 2023-05-19 RX ORDER — OXYCODONE HYDROCHLORIDE 5 MG/1
10 TABLET ORAL ONCE
Refills: 0 | Status: DISCONTINUED | OUTPATIENT
Start: 2023-05-19 | End: 2023-05-19

## 2023-05-19 RX ORDER — SODIUM CHLORIDE 9 MG/ML
1000 INJECTION, SOLUTION INTRAVENOUS
Refills: 0 | Status: DISCONTINUED | OUTPATIENT
Start: 2023-05-19 | End: 2023-05-22

## 2023-05-19 RX ORDER — DEXTROSE 50 % IN WATER 50 %
25 SYRINGE (ML) INTRAVENOUS ONCE
Refills: 0 | Status: DISCONTINUED | OUTPATIENT
Start: 2023-05-19 | End: 2023-05-22

## 2023-05-19 RX ORDER — TRANEXAMIC ACID 100 MG/ML
1 INJECTION, SOLUTION INTRAVENOUS
Qty: 1000 | Refills: 0 | Status: DISCONTINUED | OUTPATIENT
Start: 2023-05-19 | End: 2023-05-19

## 2023-05-19 RX ORDER — HYDROMORPHONE HYDROCHLORIDE 2 MG/ML
1 INJECTION INTRAMUSCULAR; INTRAVENOUS; SUBCUTANEOUS EVERY 6 HOURS
Refills: 0 | Status: DISCONTINUED | OUTPATIENT
Start: 2023-05-19 | End: 2023-05-22

## 2023-05-19 RX ORDER — PREGABALIN 225 MG/1
1 CAPSULE ORAL
Refills: 0 | DISCHARGE

## 2023-05-19 RX ORDER — INSULIN LISPRO 100/ML
VIAL (ML) SUBCUTANEOUS
Refills: 0 | Status: DISCONTINUED | OUTPATIENT
Start: 2023-05-19 | End: 2023-05-22

## 2023-05-19 RX ORDER — DEXTROSE 50 % IN WATER 50 %
12.5 SYRINGE (ML) INTRAVENOUS ONCE
Refills: 0 | Status: DISCONTINUED | OUTPATIENT
Start: 2023-05-19 | End: 2023-05-22

## 2023-05-19 RX ORDER — OXYCODONE HYDROCHLORIDE 5 MG/1
5 TABLET ORAL EVERY 6 HOURS
Refills: 0 | Status: DISCONTINUED | OUTPATIENT
Start: 2023-05-19 | End: 2023-05-22

## 2023-05-19 RX ORDER — CELECOXIB 200 MG/1
200 CAPSULE ORAL ONCE
Refills: 0 | Status: COMPLETED | OUTPATIENT
Start: 2023-05-19 | End: 2023-05-19

## 2023-05-19 RX ORDER — FENTANYL CITRATE 50 UG/ML
50 INJECTION INTRAVENOUS
Refills: 0 | Status: DISCONTINUED | OUTPATIENT
Start: 2023-05-19 | End: 2023-05-19

## 2023-05-19 RX ORDER — GLUCAGON INJECTION, SOLUTION 0.5 MG/.1ML
1 INJECTION, SOLUTION SUBCUTANEOUS ONCE
Refills: 0 | Status: DISCONTINUED | OUTPATIENT
Start: 2023-05-19 | End: 2023-05-22

## 2023-05-19 RX ORDER — METFORMIN HYDROCHLORIDE 850 MG/1
0 TABLET ORAL
Qty: 0 | Refills: 0 | DISCHARGE

## 2023-05-19 RX ORDER — TRANEXAMIC ACID 100 MG/ML
1 INJECTION, SOLUTION INTRAVENOUS ONCE
Refills: 0 | Status: DISCONTINUED | OUTPATIENT
Start: 2023-05-19 | End: 2023-05-22

## 2023-05-19 RX ORDER — HYDROMORPHONE HYDROCHLORIDE 2 MG/ML
0.5 INJECTION INTRAMUSCULAR; INTRAVENOUS; SUBCUTANEOUS
Refills: 0 | Status: DISCONTINUED | OUTPATIENT
Start: 2023-05-19 | End: 2023-05-19

## 2023-05-19 RX ORDER — GABAPENTIN 400 MG/1
300 CAPSULE ORAL THREE TIMES A DAY
Refills: 0 | Status: DISCONTINUED | OUTPATIENT
Start: 2023-05-19 | End: 2023-05-22

## 2023-05-19 RX ORDER — TRAMADOL HYDROCHLORIDE 50 MG/1
50 TABLET ORAL EVERY 6 HOURS
Refills: 0 | Status: DISCONTINUED | OUTPATIENT
Start: 2023-05-19 | End: 2023-05-22

## 2023-05-19 RX ORDER — ATORVASTATIN CALCIUM 80 MG/1
20 TABLET, FILM COATED ORAL AT BEDTIME
Refills: 0 | Status: DISCONTINUED | OUTPATIENT
Start: 2023-05-19 | End: 2023-05-22

## 2023-05-19 RX ADMIN — GABAPENTIN 300 MILLIGRAM(S): 400 CAPSULE ORAL at 14:09

## 2023-05-19 RX ADMIN — OXYCODONE HYDROCHLORIDE 10 MILLIGRAM(S): 5 TABLET ORAL at 07:12

## 2023-05-19 RX ADMIN — OXYCODONE HYDROCHLORIDE 10 MILLIGRAM(S): 5 TABLET ORAL at 07:10

## 2023-05-19 RX ADMIN — CELECOXIB 200 MILLIGRAM(S): 200 CAPSULE ORAL at 07:10

## 2023-05-19 RX ADMIN — ATORVASTATIN CALCIUM 20 MILLIGRAM(S): 80 TABLET, FILM COATED ORAL at 22:37

## 2023-05-19 RX ADMIN — SODIUM CHLORIDE 75 MILLILITER(S): 9 INJECTION, SOLUTION INTRAVENOUS at 12:15

## 2023-05-19 RX ADMIN — GABAPENTIN 300 MILLIGRAM(S): 400 CAPSULE ORAL at 22:36

## 2023-05-19 RX ADMIN — CELECOXIB 200 MILLIGRAM(S): 200 CAPSULE ORAL at 07:12

## 2023-05-19 RX ADMIN — Medication 100 MILLIGRAM(S): at 16:24

## 2023-05-19 RX ADMIN — Medication 100 MILLIGRAM(S): at 23:46

## 2023-05-19 RX ADMIN — GABAPENTIN 300 MILLIGRAM(S): 400 CAPSULE ORAL at 07:09

## 2023-05-19 RX ADMIN — HYDROMORPHONE HYDROCHLORIDE 0.5 MILLIGRAM(S): 2 INJECTION INTRAMUSCULAR; INTRAVENOUS; SUBCUTANEOUS at 11:35

## 2023-05-19 RX ADMIN — HYDROMORPHONE HYDROCHLORIDE 0.5 MILLIGRAM(S): 2 INJECTION INTRAMUSCULAR; INTRAVENOUS; SUBCUTANEOUS at 11:20

## 2023-05-19 NOTE — BRIEF OPERATIVE NOTE - NSICDXBRIEFPREOP_GEN_ALL_CORE_FT
PRE-OP DIAGNOSIS:  Cervical spinal stenosis 19-May-2023 10:43:50  Edmund Ruiz  Cervical spondylitic cord compression 19-May-2023 10:44:01  Edmund Ruiz  Stenosis of cervical spine with myelopathy 19-May-2023 10:44:18  Edmund Ruiz  Displacement of cervical intervertebral disc with myelopathy 19-May-2023 10:44:35  Edmund Ruiz

## 2023-05-19 NOTE — BRIEF OPERATIVE NOTE - NSICDXBRIEFPOSTOP_GEN_ALL_CORE_FT
POST-OP DIAGNOSIS:  Stenosis, cervical spine 19-May-2023 10:45:16  Edmund Ruiz  Cervical spondylitic cord compression 19-May-2023 10:45:26  Edmund Ruiz  Stenosis of cervical spine with myelopathy 19-May-2023 10:45:43  Edmund Ruiz  Herniation of intervertebral disc of cervical spine with myelopathy 19-May-2023 10:46:09  Edmund Ruiz

## 2023-05-19 NOTE — BRIEF OPERATIVE NOTE - NSICDXBRIEFPROCEDURE_GEN_ALL_CORE_FT
PROCEDURES:  Anterior cervical discectomy and fusion (ACDF) at 2 levels 19-May-2023 10:43:36  Edmund Ruiz

## 2023-05-19 NOTE — BRIEF OPERATIVE NOTE - COMMENTS
intraoperative neurophysiologic monitoring including SSEP, EMG, MEP remained stable throughout the entire procedure

## 2023-05-20 LAB
BASOPHILS # BLD AUTO: 0.01 K/UL — SIGNIFICANT CHANGE UP (ref 0–0.2)
BASOPHILS NFR BLD AUTO: 0.1 % — SIGNIFICANT CHANGE UP (ref 0–2)
BUN SERPL-MCNC: 8 MG/DL — SIGNIFICANT CHANGE UP (ref 7–23)
CALCIUM SERPL-MCNC: 10 MG/DL — SIGNIFICANT CHANGE UP (ref 8.5–10.1)
CHLORIDE SERPL-SCNC: 111 MMOL/L — HIGH (ref 96–108)
CO2 SERPL-SCNC: 31 MMOL/L — SIGNIFICANT CHANGE UP (ref 22–31)
CREAT SERPL-MCNC: 0.76 MG/DL — SIGNIFICANT CHANGE UP (ref 0.5–1.3)
EGFR: 90 ML/MIN/1.73M2 — SIGNIFICANT CHANGE UP
EOSINOPHIL # BLD AUTO: 0.03 K/UL — SIGNIFICANT CHANGE UP (ref 0–0.5)
EOSINOPHIL NFR BLD AUTO: 0.3 % — SIGNIFICANT CHANGE UP (ref 0–6)
GLUCOSE BLDC GLUCOMTR-MCNC: 101 MG/DL — HIGH (ref 70–99)
GLUCOSE BLDC GLUCOMTR-MCNC: 78 MG/DL — SIGNIFICANT CHANGE UP (ref 70–99)
GLUCOSE BLDC GLUCOMTR-MCNC: 97 MG/DL — SIGNIFICANT CHANGE UP (ref 70–99)
GLUCOSE BLDC GLUCOMTR-MCNC: 99 MG/DL — SIGNIFICANT CHANGE UP (ref 70–99)
GLUCOSE SERPL-MCNC: 95 MG/DL — SIGNIFICANT CHANGE UP (ref 70–99)
HCT VFR BLD CALC: 39.6 % — SIGNIFICANT CHANGE UP (ref 34.5–45)
HGB BLD-MCNC: 12.5 G/DL — SIGNIFICANT CHANGE UP (ref 11.5–15.5)
IMM GRANULOCYTES NFR BLD AUTO: 0.3 % — SIGNIFICANT CHANGE UP (ref 0–0.9)
LYMPHOCYTES # BLD AUTO: 2.29 K/UL — SIGNIFICANT CHANGE UP (ref 1–3.3)
LYMPHOCYTES # BLD AUTO: 22.6 % — SIGNIFICANT CHANGE UP (ref 13–44)
MCHC RBC-ENTMCNC: 30.3 PG — SIGNIFICANT CHANGE UP (ref 27–34)
MCHC RBC-ENTMCNC: 31.6 GM/DL — LOW (ref 32–36)
MCV RBC AUTO: 95.9 FL — SIGNIFICANT CHANGE UP (ref 80–100)
MONOCYTES # BLD AUTO: 0.57 K/UL — SIGNIFICANT CHANGE UP (ref 0–0.9)
MONOCYTES NFR BLD AUTO: 5.6 % — SIGNIFICANT CHANGE UP (ref 2–14)
NEUTROPHILS # BLD AUTO: 7.2 K/UL — SIGNIFICANT CHANGE UP (ref 1.8–7.4)
NEUTROPHILS NFR BLD AUTO: 71.1 % — SIGNIFICANT CHANGE UP (ref 43–77)
PLATELET # BLD AUTO: 242 K/UL — SIGNIFICANT CHANGE UP (ref 150–400)
POTASSIUM SERPL-MCNC: 4.6 MMOL/L — SIGNIFICANT CHANGE UP (ref 3.5–5.3)
POTASSIUM SERPL-SCNC: 4.6 MMOL/L — SIGNIFICANT CHANGE UP (ref 3.5–5.3)
RBC # BLD: 4.13 M/UL — SIGNIFICANT CHANGE UP (ref 3.8–5.2)
RBC # FLD: 12.6 % — SIGNIFICANT CHANGE UP (ref 10.3–14.5)
SODIUM SERPL-SCNC: 144 MMOL/L — SIGNIFICANT CHANGE UP (ref 135–145)
WBC # BLD: 10.13 K/UL — SIGNIFICANT CHANGE UP (ref 3.8–10.5)
WBC # FLD AUTO: 10.13 K/UL — SIGNIFICANT CHANGE UP (ref 3.8–10.5)

## 2023-05-20 PROCEDURE — 99221 1ST HOSP IP/OBS SF/LOW 40: CPT

## 2023-05-20 RX ADMIN — GABAPENTIN 300 MILLIGRAM(S): 400 CAPSULE ORAL at 05:34

## 2023-05-20 RX ADMIN — GABAPENTIN 300 MILLIGRAM(S): 400 CAPSULE ORAL at 13:59

## 2023-05-20 RX ADMIN — PANTOPRAZOLE SODIUM 40 MILLIGRAM(S): 20 TABLET, DELAYED RELEASE ORAL at 08:08

## 2023-05-20 RX ADMIN — ATORVASTATIN CALCIUM 20 MILLIGRAM(S): 80 TABLET, FILM COATED ORAL at 22:20

## 2023-05-20 RX ADMIN — GABAPENTIN 300 MILLIGRAM(S): 400 CAPSULE ORAL at 22:20

## 2023-05-20 NOTE — PHYSICAL THERAPY INITIAL EVALUATION ADULT - MODALITIES TREATMENT COMMENTS
BP in supine prior to PT encounter 128/70, sitting EOB at this time 119/71, standing 99/64 -nsg notified

## 2023-05-20 NOTE — PHYSICAL THERAPY INITIAL EVALUATION ADULT - GENERAL OBSERVATIONS, REHAB EVAL
pt rec'd supine in bed, Aspen collar, LAWRENCE drain in place. pt remarks having gotten dizzy when up earlier

## 2023-05-20 NOTE — PROGRESS NOTE ADULT - SUBJECTIVE AND OBJECTIVE BOX
POD #1  2 level ACDF    Patient seen and examined    Pain controlled  Denies UE sens or motor deficits    Dsg dry    Serosang drain output noted    Swallowing liquids and soft food    Was dizzy when up (sitting and walking)    Vitals signs stable     - S/P ACDF   - Not stable to D/C due to dizzyness   - Will attempt ambulate again later   - D/C drain in AM   - Expect D/C home in AM    PRINCE Edmond MD

## 2023-05-20 NOTE — PHYSICAL THERAPY INITIAL EVALUATION ADULT - ACTIVE RANGE OF MOTION EXAMINATION, REHAB EVAL
except BUE shld tested to <90deg/bilateral upper extremity Active ROM was WFL (within functional limits)/bilateral  lower extremity Active ROM was WFL (within functional limits)

## 2023-05-20 NOTE — PROGRESS NOTE ADULT - SUBJECTIVE AND OBJECTIVE BOX
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      SUBJECTIVE: Patient seen and examined, pain controlled, tolerating liquids and medications, some dysphagia with thicker foods, feels "a little wobbly" when up for restroom, awaits PT eval    Current Pain Management:  [ ] PCA   [x] Po Analgesics [ ] IM /IV Anagesics     Vital Signs Last 24 Hrs  T(C): 36.5 (20 May 2023 07:53), Max: 36.8 (19 May 2023 12:00)  T(F): 97.7 (20 May 2023 07:53), Max: 98.3 (19 May 2023 12:00)  HR: 70 (20 May 2023 07:53) (68 - 80)  BP: 116/67 (20 May 2023 07:53) (111/55 - 124/78)  BP(mean): --  RR: 16 (20 May 2023 07:53) (12 - 20)  SpO2: 100% (20 May 2023 07:53) (99% - 100%)    Parameters below as of 20 May 2023 07:53  Patient On (Oxygen Delivery Method): room air      I&O's Detail    19 May 2023 07:01  -  20 May 2023 07:00  --------------------------------------------------------  IN:    Lactated Ringers: 600 mL    Other (mL): 1000 mL  Total IN: 1600 mL    OUT:    Bulb (mL): 65 mL  Total OUT: 65 mL    Total NET: 1535 mL        OBJECTIVE:       Wound /Dressing: dressing intact, collar in place  Drain: 25cc last 12H, 65cc last 24H - kept  Cervical ROM: not tested, collar in place  Neurological: A/O x 3              Sensation: [x] intact to light touch  [ ] decreased:          Motor exam: [x]          [x] Upper extremity          Delt       Bicp        Tri    Wrist ext  Wrst Flex          Digit Ext   Digit Flex                                     R         5/5        5/5        5/5       5/5            5/5            5/5       5/5          5/5                                     L          5/5        5/5        5/5       5/5            5/5            5/5       5/5          5/5         [x] Lower ext.         Hip Flx    Quad   Hamstrg         TA         EHL       GS                              R        5/5        5/5        5/5             5/5        5/5        5/5                                     L         5/5        5/5        5/5             5/5        5/5        5/5                                                                [x] Vascular: intact           Tension Signs: none          Long Tract Findings: none       LABS:                        12.5   10.13 )-----------( 242      ( 20 May 2023 07:45 )             39.6     05-20    144  |  111<H>  |  8   ----------------------------<  95  4.6   |  31  |  0.76    Ca    10.0      20 May 2023 07:45

## 2023-05-20 NOTE — PHYSICAL THERAPY INITIAL EVALUATION ADULT - GAIT DISTANCE, PT EVAL
w/o AD w/ CCG, ~80 ft w/ RW w/ CG/10 feet w/o AD w/ CCG, ~80 ft w/ RW w/ CG. c/o face feeling hot suddenly upon amb back to room-nsg notified/10 feet

## 2023-05-20 NOTE — PHYSICAL THERAPY INITIAL EVALUATION ADULT - LEVEL OF INDEPENDENCE: SIT/STAND, REHAB EVAL
from bed, CS from toilet/contact guard from bed, CS from toilet. no inc in dizziness w/ stand/contact guard

## 2023-05-21 LAB
ADD ON TEST-SPECIMEN IN LAB: SIGNIFICANT CHANGE UP
ALBUMIN SERPL ELPH-MCNC: 3.2 G/DL — LOW (ref 3.3–5)
ALP SERPL-CCNC: 74 U/L — SIGNIFICANT CHANGE UP (ref 40–120)
ALT FLD-CCNC: 25 U/L — SIGNIFICANT CHANGE UP (ref 12–78)
ANION GAP SERPL CALC-SCNC: 3 MMOL/L — LOW (ref 5–17)
AST SERPL-CCNC: 30 U/L — SIGNIFICANT CHANGE UP (ref 15–37)
BASOPHILS # BLD AUTO: 0.03 K/UL — SIGNIFICANT CHANGE UP (ref 0–0.2)
BASOPHILS NFR BLD AUTO: 0.3 % — SIGNIFICANT CHANGE UP (ref 0–2)
BILIRUB SERPL-MCNC: 0.7 MG/DL — SIGNIFICANT CHANGE UP (ref 0.2–1.2)
BUN SERPL-MCNC: 9 MG/DL — SIGNIFICANT CHANGE UP (ref 7–23)
CALCIUM SERPL-MCNC: 9.6 MG/DL — SIGNIFICANT CHANGE UP (ref 8.5–10.1)
CHLORIDE SERPL-SCNC: 106 MMOL/L — SIGNIFICANT CHANGE UP (ref 96–108)
CO2 SERPL-SCNC: 30 MMOL/L — SIGNIFICANT CHANGE UP (ref 22–31)
CREAT SERPL-MCNC: 0.82 MG/DL — SIGNIFICANT CHANGE UP (ref 0.5–1.3)
EGFR: 82 ML/MIN/1.73M2 — SIGNIFICANT CHANGE UP
EOSINOPHIL # BLD AUTO: 0.05 K/UL — SIGNIFICANT CHANGE UP (ref 0–0.5)
EOSINOPHIL NFR BLD AUTO: 0.6 % — SIGNIFICANT CHANGE UP (ref 0–6)
GLUCOSE BLDC GLUCOMTR-MCNC: 126 MG/DL — HIGH (ref 70–99)
GLUCOSE BLDC GLUCOMTR-MCNC: 130 MG/DL — HIGH (ref 70–99)
GLUCOSE BLDC GLUCOMTR-MCNC: 90 MG/DL — SIGNIFICANT CHANGE UP (ref 70–99)
GLUCOSE BLDC GLUCOMTR-MCNC: 91 MG/DL — SIGNIFICANT CHANGE UP (ref 70–99)
GLUCOSE BLDC GLUCOMTR-MCNC: 95 MG/DL — SIGNIFICANT CHANGE UP (ref 70–99)
GLUCOSE SERPL-MCNC: 118 MG/DL — HIGH (ref 70–99)
HCT VFR BLD CALC: 39.2 % — SIGNIFICANT CHANGE UP (ref 34.5–45)
HGB BLD-MCNC: 12.4 G/DL — SIGNIFICANT CHANGE UP (ref 11.5–15.5)
IMM GRANULOCYTES NFR BLD AUTO: 0.3 % — SIGNIFICANT CHANGE UP (ref 0–0.9)
LYMPHOCYTES # BLD AUTO: 1.79 K/UL — SIGNIFICANT CHANGE UP (ref 1–3.3)
LYMPHOCYTES # BLD AUTO: 20.7 % — SIGNIFICANT CHANGE UP (ref 13–44)
MAGNESIUM SERPL-MCNC: 2 MG/DL — SIGNIFICANT CHANGE UP (ref 1.6–2.6)
MCHC RBC-ENTMCNC: 30.2 PG — SIGNIFICANT CHANGE UP (ref 27–34)
MCHC RBC-ENTMCNC: 31.6 GM/DL — LOW (ref 32–36)
MCV RBC AUTO: 95.6 FL — SIGNIFICANT CHANGE UP (ref 80–100)
MONOCYTES # BLD AUTO: 0.55 K/UL — SIGNIFICANT CHANGE UP (ref 0–0.9)
MONOCYTES NFR BLD AUTO: 6.4 % — SIGNIFICANT CHANGE UP (ref 2–14)
NEUTROPHILS # BLD AUTO: 6.21 K/UL — SIGNIFICANT CHANGE UP (ref 1.8–7.4)
NEUTROPHILS NFR BLD AUTO: 71.7 % — SIGNIFICANT CHANGE UP (ref 43–77)
PHOSPHATE SERPL-MCNC: 3.3 MG/DL — SIGNIFICANT CHANGE UP (ref 2.5–4.5)
PLATELET # BLD AUTO: 231 K/UL — SIGNIFICANT CHANGE UP (ref 150–400)
POTASSIUM SERPL-MCNC: 4.1 MMOL/L — SIGNIFICANT CHANGE UP (ref 3.5–5.3)
POTASSIUM SERPL-SCNC: 4.1 MMOL/L — SIGNIFICANT CHANGE UP (ref 3.5–5.3)
PROT SERPL-MCNC: 6.9 GM/DL — SIGNIFICANT CHANGE UP (ref 6–8.3)
RBC # BLD: 4.1 M/UL — SIGNIFICANT CHANGE UP (ref 3.8–5.2)
RBC # FLD: 12.7 % — SIGNIFICANT CHANGE UP (ref 10.3–14.5)
SODIUM SERPL-SCNC: 139 MMOL/L — SIGNIFICANT CHANGE UP (ref 135–145)
TROPONIN I, HIGH SENSITIVITY RESULT: 3.25 NG/L — SIGNIFICANT CHANGE UP
TROPONIN I, HIGH SENSITIVITY RESULT: 3.39 NG/L — SIGNIFICANT CHANGE UP
TROPONIN I, HIGH SENSITIVITY RESULT: 4.04 NG/L — SIGNIFICANT CHANGE UP
WBC # BLD: 8.66 K/UL — SIGNIFICANT CHANGE UP (ref 3.8–10.5)
WBC # FLD AUTO: 8.66 K/UL — SIGNIFICANT CHANGE UP (ref 3.8–10.5)

## 2023-05-21 PROCEDURE — 99233 SBSQ HOSP IP/OBS HIGH 50: CPT

## 2023-05-21 PROCEDURE — 93010 ELECTROCARDIOGRAM REPORT: CPT

## 2023-05-21 RX ORDER — ONDANSETRON 8 MG/1
4 TABLET, FILM COATED ORAL EVERY 8 HOURS
Refills: 0 | Status: DISCONTINUED | OUTPATIENT
Start: 2023-05-21 | End: 2023-05-22

## 2023-05-21 RX ORDER — SODIUM CHLORIDE 9 MG/ML
1000 INJECTION INTRAMUSCULAR; INTRAVENOUS; SUBCUTANEOUS
Refills: 0 | Status: COMPLETED | OUTPATIENT
Start: 2023-05-21 | End: 2023-05-21

## 2023-05-21 RX ADMIN — SODIUM CHLORIDE 500 MILLILITER(S): 9 INJECTION INTRAMUSCULAR; INTRAVENOUS; SUBCUTANEOUS at 09:58

## 2023-05-21 RX ADMIN — PANTOPRAZOLE SODIUM 40 MILLIGRAM(S): 20 TABLET, DELAYED RELEASE ORAL at 06:25

## 2023-05-21 RX ADMIN — GABAPENTIN 300 MILLIGRAM(S): 400 CAPSULE ORAL at 06:25

## 2023-05-21 RX ADMIN — ATORVASTATIN CALCIUM 20 MILLIGRAM(S): 80 TABLET, FILM COATED ORAL at 21:25

## 2023-05-21 RX ADMIN — GABAPENTIN 300 MILLIGRAM(S): 400 CAPSULE ORAL at 21:25

## 2023-05-21 RX ADMIN — GABAPENTIN 300 MILLIGRAM(S): 400 CAPSULE ORAL at 14:03

## 2023-05-21 NOTE — PROGRESS NOTE ADULT - SUBJECTIVE AND OBJECTIVE BOX
C/c: cervical stenosis    HPI:  62F, pmh of HLD, DMII, Endometrial ca s/p hysterectomy, DVT in 2010 no longer on a/c with Hx. Cervical spinal stenosis with radiculopathy, s/p C4-C6 ACDF, on 5/19/23 consulted for post op hypotension which spontaneously improved.     pt seen and examined this am. was having more difficulty swallowing today. No sob/chest pain. No n/v. No difficulty voiding.   Subsequently was called by rn that pt had a syncopal episode right after her drain was removed.   Had c/o seeing spots, then eyes rolled up per RN/PA at bedside. sbp 90s at bedside, hr stable, spo2 stable. Has had syncope related to blood draw in the past.     ROS: all 10 systems reviewed and is as above otherwise negative.     Vital Signs Last 24 Hrs  T(C): 36.4 (21 May 2023 08:37), Max: 37.3 (20 May 2023 16:15)  T(F): 97.5 (21 May 2023 08:37), Max: 99.2 (20 May 2023 16:15)  HR: 69 (21 May 2023 08:37) (68 - 86)  BP: 121/58 (21 May 2023 08:37) (116/71 - 131/81)  RR: 18 (21 May 2023 08:37) (16 - 18)  SpO2: 100% (21 May 2023 08:37) (97% - 100%)    Parameters below as of 21 May 2023 08:37  Patient On (Oxygen Delivery Method): room air    PHYSICAL EXAM:    GENERAL: Comfortable, no acute distress   HEAD:  Normocephalic, atraumatic  EYES: EOMI, PERRLA  HEENT: Moist mucous membranes  NECK: Supple, No JVD  NERVOUS SYSTEM:  Alert & Oriented X3, Motor Strength 5/5 B/L upper and lower extremities  CHEST/LUNG: Clear to auscultation bilaterally  HEART: Regular rate and rhythm  ABDOMEN: Soft, Nontender, Nondistended, Bowel sounds present  GENITOURINARY: Voiding, no palpable bladder  EXTREMITIES:   No clubbing, cyanosis, or edema  MUSCULOSKELETAL- No muscle tenderness, no joint tenderness  SKIN-no rash                   C/c: cervical stenosis    HPI:  62F, pmh of HLD, DMII, Endometrial ca s/p hysterectomy, DVT in 2010 no longer on a/c with Hx. Cervical spinal stenosis with radiculopathy, s/p C4-C6 ACDF, on 5/19/23 consulted for post op hypotension which spontaneously improved.     pt seen and examined this am. was having more difficulty swallowing today. No sob/chest pain. No n/v. No difficulty voiding.   Subsequently was called by rn that pt had a syncopal episode right after her drain was removed while in seated position.  Had c/o seeing spots, then eyes rolled up per RN/PA at bedside. sbp 90s at bedside, hr stable, spo2 stable. Has had syncope related to blood draw in the past.     ROS: all 10 systems reviewed and is as above otherwise negative.     Vital Signs Last 24 Hrs  T(C): 36.4 (21 May 2023 08:37), Max: 37.3 (20 May 2023 16:15)  T(F): 97.5 (21 May 2023 08:37), Max: 99.2 (20 May 2023 16:15)  HR: 69 (21 May 2023 08:37) (68 - 86)  BP: 121/58 (21 May 2023 08:37) (116/71 - 131/81)  RR: 18 (21 May 2023 08:37) (16 - 18)  SpO2: 100% (21 May 2023 08:37) (97% - 100%)    Parameters below as of 21 May 2023 08:37  Patient On (Oxygen Delivery Method): room air    PHYSICAL EXAM:    GENERAL: Comfortable, no acute distress   HEAD:  Normocephalic, atraumatic  EYES: EOMI, PERRLA  HEENT: Moist mucous membranes  NECK: Supple, No JVD  NERVOUS SYSTEM:  Alert & Oriented X3, Motor Strength 5/5 B/L upper and lower extremities  CHEST/LUNG: Clear to auscultation bilaterally  HEART: Regular rate and rhythm  ABDOMEN: Soft, Nontender, Nondistended, Bowel sounds present  MEDICATIONS  (STANDING):  atorvastatin 20 milliGRAM(s) Oral at bedtime  dextrose 5%. 1000 milliLiter(s) (50 mL/Hr) IV Continuous <Continuous>  dextrose 5%. 1000 milliLiter(s) (100 mL/Hr) IV Continuous <Continuous>  dextrose 50% Injectable 25 Gram(s) IV Push once  dextrose 50% Injectable 12.5 Gram(s) IV Push once  dextrose 50% Injectable 25 Gram(s) IV Push once  gabapentin 300 milliGRAM(s) Oral three times a day  glucagon  Injectable 1 milliGRAM(s) IntraMuscular once  insulin lispro (ADMELOG) corrective regimen sliding scale   SubCutaneous three times a day before meals  lactated ringers. 1000 milliLiter(s) (75 mL/Hr) IV Continuous <Continuous>  pantoprazole    Tablet 40 milliGRAM(s) Oral before breakfast  tranexamic acid 2% Topical Irrigation 1 Application(s) IntraArticular once    MEDICATIONS  (PRN):  dextrose Oral Gel 15 Gram(s) Oral once PRN Blood Glucose LESS THAN 70 milliGRAM(s)/deciliter  HYDROmorphone  Injectable 1 milliGRAM(s) IV Push every 6 hours PRN Severe Pain (7 - 10)  ondansetron    Tablet 4 milliGRAM(s) Oral every 8 hours PRN Nausea and/or Vomiting  oxyCODONE    IR 5 milliGRAM(s) Oral every 6 hours PRN Moderate Pain (4 - 6)  oxyCODONE    IR 10 milliGRAM(s) Oral every 4 hours PRN Severe Pain (7 - 10)  traMADol 50 milliGRAM(s) Oral every 6 hours PRN Mild Pain (1 - 3)  GENITOURINARY: Voiding, no palpable bladder  EXTREMITIES:   No clubbing, cyanosis, or edema  MUSCULOSKELETAL- No muscle tenderness, no joint tenderness  SKIN-no rash        LABS:                        12.4   8.66  )-----------( 231      ( 21 May 2023 09:58 )             39.2     05-20    144  |  111<H>  |  8   ----------------------------<  95  4.6   |  31  |  0.76    Ca    10.0      20 May 2023 07:45    MEDICATIONS  (STANDING):  atorvastatin 20 milliGRAM(s) Oral at bedtime  dextrose 5%. 1000 milliLiter(s) (50 mL/Hr) IV Continuous <Continuous>  dextrose 5%. 1000 milliLiter(s) (100 mL/Hr) IV Continuous <Continuous>  dextrose 50% Injectable 25 Gram(s) IV Push once  dextrose 50% Injectable 25 Gram(s) IV Push once  dextrose 50% Injectable 12.5 Gram(s) IV Push once  gabapentin 300 milliGRAM(s) Oral three times a day  glucagon  Injectable 1 milliGRAM(s) IntraMuscular once  insulin lispro (ADMELOG) corrective regimen sliding scale   SubCutaneous three times a day before meals  lactated ringers. 1000 milliLiter(s) (75 mL/Hr) IV Continuous <Continuous>  pantoprazole    Tablet 40 milliGRAM(s) Oral before breakfast  tranexamic acid 2% Topical Irrigation 1 Application(s) IntraArticular once    MEDICATIONS  (PRN):  dextrose Oral Gel 15 Gram(s) Oral once PRN Blood Glucose LESS THAN 70 milliGRAM(s)/deciliter  HYDROmorphone  Injectable 1 milliGRAM(s) IV Push every 6 hours PRN Severe Pain (7 - 10)  ondansetron    Tablet 4 milliGRAM(s) Oral every 8 hours PRN Nausea and/or Vomiting  oxyCODONE    IR 5 milliGRAM(s) Oral every 6 hours PRN Moderate Pain (4 - 6)  oxyCODONE    IR 10 milliGRAM(s) Oral every 4 hours PRN Severe Pain (7 - 10)  traMADol 50 milliGRAM(s) Oral every 6 hours PRN Mild Pain (1 - 3)    ASSESSMENT AND PLAN:  62F, pmh of HLD, DMII, Endometrial ca s/p hysterectomy, DVT in 2010 no longer on a/c with Hx. Cervical spinal stenosis with radiculopathy, s/p C4-C6 ACDF, on 5/19/23 consulted for post op hypotension which spontaneously improved.     1. Probable vasovagal syncope:  -IVF bolus X 500cc  -labs checked, without significant abnormalities, f/u troponin.   -placed on remote tele-->junctional rhythm, check EKG.   -consult cardiology.     2. Cervical spine stenosis with radiculopathy:  -s/p C4-6 ACDF POD#2.  -C collar inplace  -drain removed.   -physical therapy  -incentive spirometry encouraged.   -has not gotten any narcotics today.     3. HLD;  -continue statin.     4. DMII:  -SS    5. DVT px  -venodynes    d/w patient/rn/spine           C/c: cervical stenosis    HPI:  62F, pmh of HLD, DMII, Endometrial ca s/p hysterectomy, DVT in 2010 no longer on a/c with Hx. Cervical spinal stenosis with radiculopathy, s/p C4-C6 ACDF, on 5/19/23 consulted for post op hypotension which spontaneously improved.     pt seen and examined this am. was having more difficulty swallowing today. No sob/chest pain. No n/v. No difficulty voiding.   Subsequently was called by rn that pt had a syncopal episode right after her drain was removed while in seated position.  Had c/o seeing spots, then eyes rolled up per RN/PA at bedside. sbp 90s at bedside, hr stable, spo2 stable. Has had syncope related to blood draw in the past.     ROS: all 10 systems reviewed and is as above otherwise negative.     Vital Signs Last 24 Hrs  T(C): 36.4 (21 May 2023 08:37), Max: 37.3 (20 May 2023 16:15)  T(F): 97.5 (21 May 2023 08:37), Max: 99.2 (20 May 2023 16:15)  HR: 69 (21 May 2023 08:37) (68 - 86)  BP: 121/58 (21 May 2023 08:37) (116/71 - 131/81)  RR: 18 (21 May 2023 08:37) (16 - 18)  SpO2: 100% (21 May 2023 08:37) (97% - 100%)    Parameters below as of 21 May 2023 08:37  Patient On (Oxygen Delivery Method): room air    PHYSICAL EXAM:    GENERAL: Comfortable, no acute distress   HEAD:  Normocephalic, atraumatic  EYES: EOMI, PERRLA  HEENT: Moist mucous membranes  NECK: Supple, No JVD  NERVOUS SYSTEM:  Alert & Oriented X3, Motor Strength 5/5 B/L upper and lower extremities  CHEST/LUNG: Clear to auscultation bilaterally  HEART: Regular rate and rhythm  ABDOMEN: Soft, Nontender, Nondistended, Bowel sounds present  GENITOURINARY: Voiding, no palpable bladder  EXTREMITIES:   No clubbing, cyanosis, or edema  MUSCULOSKELETAL- No muscle tenderness, no joint tenderness  SKIN-no rash        LABS:                        12.4   8.66  )-----------( 231      ( 21 May 2023 09:58 )             39.2     05-20    144  |  111<H>  |  8   ----------------------------<  95  4.6   |  31  |  0.76    Ca    10.0      20 May 2023 07:45    MEDICATIONS  (STANDING):  atorvastatin 20 milliGRAM(s) Oral at bedtime  dextrose 5%. 1000 milliLiter(s) (50 mL/Hr) IV Continuous <Continuous>  dextrose 5%. 1000 milliLiter(s) (100 mL/Hr) IV Continuous <Continuous>  dextrose 50% Injectable 25 Gram(s) IV Push once  dextrose 50% Injectable 25 Gram(s) IV Push once  dextrose 50% Injectable 12.5 Gram(s) IV Push once  gabapentin 300 milliGRAM(s) Oral three times a day  glucagon  Injectable 1 milliGRAM(s) IntraMuscular once  insulin lispro (ADMELOG) corrective regimen sliding scale   SubCutaneous three times a day before meals  lactated ringers. 1000 milliLiter(s) (75 mL/Hr) IV Continuous <Continuous>  pantoprazole    Tablet 40 milliGRAM(s) Oral before breakfast  tranexamic acid 2% Topical Irrigation 1 Application(s) IntraArticular once    MEDICATIONS  (PRN):  dextrose Oral Gel 15 Gram(s) Oral once PRN Blood Glucose LESS THAN 70 milliGRAM(s)/deciliter  HYDROmorphone  Injectable 1 milliGRAM(s) IV Push every 6 hours PRN Severe Pain (7 - 10)  ondansetron    Tablet 4 milliGRAM(s) Oral every 8 hours PRN Nausea and/or Vomiting  oxyCODONE    IR 5 milliGRAM(s) Oral every 6 hours PRN Moderate Pain (4 - 6)  oxyCODONE    IR 10 milliGRAM(s) Oral every 4 hours PRN Severe Pain (7 - 10)  traMADol 50 milliGRAM(s) Oral every 6 hours PRN Mild Pain (1 - 3)    ASSESSMENT AND PLAN:  62F, pmh of HLD, DMII, Endometrial ca s/p hysterectomy, DVT in 2010 no longer on a/c with Hx. Cervical spinal stenosis with radiculopathy, s/p C4-C6 ACDF, on 5/19/23 consulted for post op hypotension which spontaneously improved.     1. Probable vasovagal syncope:  -IVF bolus X 500cc  -labs checked, without significant abnormalities, f/u troponin.   -placed on remote tele-->junctional rhythm, check EKG.   -consult cardiology.     2. Cervical spine stenosis with radiculopathy:  -s/p C4-6 ACDF POD#2.  -C collar inplace  -drain removed.   -physical therapy  -incentive spirometry encouraged.   -has not gotten any narcotics today.     3. HLD;  -continue statin.     4. DMII:  -SS    5. DVT px  -venodynes    d/w patient/rn/spine

## 2023-05-21 NOTE — PROGRESS NOTE ADULT - ASSESSMENT
s/p ACDF C4-6  --PT/mobilization  --encourage incentive spirometry  --bowel regimen  --DVT ppx w/SCDs  --possible discharge home later today if does well with PT and drain output decreases
s/p ACDF C4-6  --EKG pending  --medical care as per hospitalist  --PT/mob when medically stable  --DVT ppx w/SCDs

## 2023-05-21 NOTE — PROGRESS NOTE ADULT - SUBJECTIVE AND OBJECTIVE BOX
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 On removal of drain this morning patient stated "I see spots in my vision" and "I don't feel well." Inquired if she was in pain which she denied, then eyes rolled upwards and patient became unfocused and non-responsive for 20-30 seconds. RNs called into room as did hospitalist Dr. Perez. IVF bolus and EKG ordered     Pain (0-10):   Current Pain Management:  [ ] PCA   [x] Po Analgesics [ ] IM /IV Anagesics     Vital Signs Last 24 Hrs  T(C): 36.4 (21 May 2023 08:37), Max: 37.3 (20 May 2023 16:15)  T(F): 97.5 (21 May 2023 08:37), Max: 99.2 (20 May 2023 16:15)  HR: 69 (21 May 2023 08:37) (68 - 86)  BP: 121/58 (21 May 2023 08:37) (116/71 - 131/81)  BP(mean): --  RR: 18 (21 May 2023 08:37) (16 - 18)  SpO2: 100% (21 May 2023 08:37) (97% - 100%)    Parameters below as of 21 May 2023 08:37  Patient On (Oxygen Delivery Method): room air      I&O's Detail    20 May 2023 07:01  -  21 May 2023 07:00  --------------------------------------------------------  IN:  Total IN: 0 mL    OUT:    Bulb (mL): 40 mL  Total OUT: 40 mL    Total NET: -40 mL          OBJECTIVE:       Wound /Dressing: incision intact  Drain: 20cc - removed   Cervical ROM: not tested, collar in place  Neurological: A/O x 3              Sensation: [x] intact to light touch  [ ] decreased:          Motor exam: [x]               [x] Lower ext.    Hip Flx    Quad   Hamstrg         TA        EHL        GS                              R        5/5        5/5        5/5             5/5        5/5        5/5                                      L         5/5        5/5        5/5             5/5        5/5        5/5                                                               [x] Vascular: intact           Tension Signs: none          Long Tract Findings: none                                            LABS:                        12.4   8.66  )-----------( 231      ( 21 May 2023 09:58 )             39.2     05-20    144  |  111<H>  |  8   ----------------------------<  95  4.6   |  31  |  0.76    Ca    10.0      20 May 2023 07:45

## 2023-05-22 ENCOUNTER — TRANSCRIPTION ENCOUNTER (OUTPATIENT)
Age: 63
End: 2023-05-22

## 2023-05-22 VITALS
TEMPERATURE: 98 F | RESPIRATION RATE: 18 BRPM | DIASTOLIC BLOOD PRESSURE: 76 MMHG | SYSTOLIC BLOOD PRESSURE: 118 MMHG | HEART RATE: 72 BPM | OXYGEN SATURATION: 98 %

## 2023-05-22 LAB
GLUCOSE BLDC GLUCOMTR-MCNC: 106 MG/DL — HIGH (ref 70–99)
GLUCOSE BLDC GLUCOMTR-MCNC: 96 MG/DL — SIGNIFICANT CHANGE UP (ref 70–99)

## 2023-05-22 PROCEDURE — 93010 ELECTROCARDIOGRAM REPORT: CPT

## 2023-05-22 PROCEDURE — 99232 SBSQ HOSP IP/OBS MODERATE 35: CPT

## 2023-05-22 RX ORDER — DEXAMETHASONE 0.5 MG/5ML
4 ELIXIR ORAL ONCE
Refills: 0 | Status: COMPLETED | OUTPATIENT
Start: 2023-05-22 | End: 2023-05-22

## 2023-05-22 RX ADMIN — GABAPENTIN 300 MILLIGRAM(S): 400 CAPSULE ORAL at 05:21

## 2023-05-22 RX ADMIN — PANTOPRAZOLE SODIUM 40 MILLIGRAM(S): 20 TABLET, DELAYED RELEASE ORAL at 05:20

## 2023-05-22 RX ADMIN — GABAPENTIN 300 MILLIGRAM(S): 400 CAPSULE ORAL at 13:43

## 2023-05-22 RX ADMIN — Medication 4 MILLIGRAM(S): at 11:56

## 2023-05-22 NOTE — CONSULT NOTE ADULT - ASSESSMENT
63 yo female with Hx. Cevical spinal stenosis with radiculopathy, s/p C4-C6 ACDF, on 5/19/23 consulted for post op hypotension.  The patient is post op day one and she was able adequate amount of po water  Her Bp is improved 127/66  A/P:  Hypotension post op improved           Cevical spinal stenosis with radiculopathy, s/p C4-C6 ACDF            DM  Plan: continue current medications .   
patient with no known cardiac disease, no conduction disease on EKG and prior monitoring who have vagal reaction from pill esophagitis and subsequent procedure done while patient was having mild vagal reaction (un-noticed) triggering and more aggressive reaction-short lived an terminated with prone position and resuscitation with IV fluids.   Due to low resting BP,  patient poor PO and difficulty swallowing, midodrine was initiated temporarily.    1-no active CAD of CHF  7-[id number] bradycardia-in her condition can be triggered with dysphagia; stay hydrated, encourage PO and and no need for EPS or prolonged monitoring; repeat EKG-if similar to prior, may d/c telemetry upon discharge

## 2023-05-22 NOTE — DISCHARGE NOTE PROVIDER - NSDCFUSCHEDAPPT_GEN_ALL_CORE_FT
Catherine Marcus  [facility] Physician [facility]  [place] [address]  Scheduled Appointment: 06/14/2023

## 2023-05-22 NOTE — CONSULT NOTE ADULT - SUBJECTIVE AND OBJECTIVE BOX
HPI: The patient is a 61 yo female with Hx. Cevical spinal stenosis with radiculopathy, s/p C4-C6 ACDF, on 5/19/23 consulted for post op hypotension.     PMHx:PMHx. DM2, HLD, Low Bp , DVT in 2010,     PSHx: Hysterectomy for uterine CA  2020, bladder lift,  BCC removed. , Carpal tunnel reparin     Family Hx: no family  Hx of hypotension, father prostate CA ,  grandfather colon CA    Social Hx.: not smoking, no alcohol use    ROS:   Eyes: no changes in vision  ENT/Mouth: no changes    Cardiovascular: no chest pain    Respiratory: no SOB    Gastrointestinal: no diarrhea, no nausea, no vomiting    Genitourinary: no dysuria    Breast: no pain    Musculoskeletal: no pain    Integumentary: no itching    Neurological: No Headache, no tremor,    Endocrine: no excessive thirst,     Allergic/Immunologic: no itching    Physical Exam: Vital Signs Last 24 Hrs  T(C): 37.3 (20 May 2023 16:15), Max: 37.3 (20 May 2023 16:15)  T(F): 99.2 (20 May 2023 16:15), Max: 99.2 (20 May 2023 16:15)  HR: 84 (20 May 2023 16:15) (68 - 84)  BP: 127/66 (20 May 2023 16:15) (116/67 - 128/70)  BP(mean): --  RR: 18 (20 May 2023 16:15) (16 - 18)  SpO2: 97% (20 May 2023 16:15) (97% - 100%)    Parameters below as of 20 May 2023 16:15  Patient On (Oxygen Delivery Method): room air            HEENT: PRRL EOMI    MOUTH/TEETH/GUMS: Clear    NECK: wearing [place] J collar     LUNGS: Clear    HEART: S1,S2 RR    ABDOMEN: soft nontender    EXTREMITIES:  no pedal edema    MUSCULOSKELETAL: no joint swelling     NEURO: no tremor, no focal signs.    SKIN: no rash    : CVA negative,                           12.5   10.13 )-----------( 242      ( 20 May 2023 07:45 )             39.6       05-20    144  |  111<H>  |  8   ----------------------------<  95  4.6   |  31  |  0.76    Ca    10.0      20 May 2023 07:45                
  CHIEF COMPLAINT: Patient is a 62y old  Female who presents with a chief complaint of cervical spine stenosis (21 May 2023 10:19)      HPI: 62 year old woman-is for cervical spine surgery; was taking medication yesterday-pill stuck in back of throat, some nausea-after clearing throat multiple times, went to bathroom to brush teeth as pill was desoving in the back of her throat with disgusting taste-started to feel lightheded and went back to her chair.   At that time, ortho came in and left patient in chair and pulled neck drain-patient started to sweat, "seeing stars" and the lost consciousness for 30 seconds; transferred into bed, pulse with bradycardia/junctional rhythm, BP very low and resuscitated with IV fluids.  EKG done after event terminated but telemetry applied prior dose show junctional rhythm.   Given midodrine and placed on telemetry-within 2 minutes, went from junctional rhythm to sinus rhythm and has no issues since.        PMHx: PAST MEDICAL & SURGICAL HISTORY:  Diabetes mellitus      Uterine cancer  feb 2020      High cholesterol      Sciatica  yrs ago      DVT, lower extremity  approx 2010   tx with coumadin 6 months      Vasovagal episode  with blood draw      Cervical spinal stenosis      Herniated cervical disc      Skin cancer, basal cell      H/O carpal tunnel repair      H/O: hysterectomy      History of suburethral sling procedure      History of basal cell carcinoma excision            Soc Hx:       Allergies: Allergies    Demerol (Vomiting)    Intolerances          REVIEW OF SYSTEMS:    CONSTITUTIONAL: No weakness, fevers or chills  EYES/ENT: No visual changes;  No vertigo or throat pain   NECK: No pain or stiffness  RESPIRATORY: No cough, wheezing, hemoptysis; No shortness of breath  CARDIOVASCULAR: No chest pain or palpitations  GASTROINTESTINAL: No abdominal or epigastric pain. No nausea, vomiting, or hematemesis; No diarrhea or constipation. No melena or hematochezia.  GENITOURINARY: No dysuria, frequency or hematuria  NEUROLOGICAL: No numbness or weakness  SKIN: No itching, burning, rashes, or lesions   All other review of systems is negative unless indicated above    Vital Signs Last 24 Hrs  T(C): 36.4 (22 May 2023 07:51), Max: 36.8 (21 May 2023 09:15)  T(F): 97.5 (22 May 2023 07:51), Max: 98.3 (21 May 2023 12:00)  HR: 72 (22 May 2023 07:51) (69 - 83)  BP: 118/76 (22 May 2023 07:51) (92/67 - 121/83)  BP(mean): --  RR: 18 (22 May 2023 07:51) (17 - 18)  SpO2: 98% (22 May 2023 07:51) (96% - 100%)    Parameters below as of 22 May 2023 07:51  Patient On (Oxygen Delivery Method): room air        I&O's Summary      CAPILLARY BLOOD GLUCOSE      POCT Blood Glucose.: 106 mg/dL (22 May 2023 07:46)  POCT Blood Glucose.: 95 mg/dL (21 May 2023 23:18)  POCT Blood Glucose.: 130 mg/dL (21 May 2023 16:56)  POCT Blood Glucose.: 126 mg/dL (21 May 2023 11:18)  POCT Blood Glucose.: 91 mg/dL (21 May 2023 09:19)      PHYSICAL EXAM:   Constitutional: NAD, awake and alert, well-developed  HEENT: PERR, EOMI, Normal Hearing, MMM  Neck: Soft and supple, No LAD, No JVD  Respiratory: Breath sounds are clear bilaterally, No wheezing, rales or rhonchi  Cardiovascular: S1 and S2, regular rate and rhythm, no Murmurs, gallops or rubs  Gastrointestinal: Bowel Sounds present, soft, nontender, nondistended, no guarding, no rebound  Extremities: No peripheral edema  Vascular: 2+ peripheral pulses  Neurological: A/O x 3, no focal deficits  Musculoskeletal: 5/5 strength b/l upper and lower extremities  Skin: No rashes    MEDICATIONS:  MEDICATIONS  (STANDING):  atorvastatin 20 milliGRAM(s) Oral at bedtime  dextrose 5%. 1000 milliLiter(s) (50 mL/Hr) IV Continuous <Continuous>  dextrose 5%. 1000 milliLiter(s) (100 mL/Hr) IV Continuous <Continuous>  dextrose 50% Injectable 25 Gram(s) IV Push once  dextrose 50% Injectable 12.5 Gram(s) IV Push once  dextrose 50% Injectable 25 Gram(s) IV Push once  gabapentin 300 milliGRAM(s) Oral three times a day  glucagon  Injectable 1 milliGRAM(s) IntraMuscular once  insulin lispro (ADMELOG) corrective regimen sliding scale   SubCutaneous three times a day before meals  lactated ringers. 1000 milliLiter(s) (75 mL/Hr) IV Continuous <Continuous>  pantoprazole    Tablet 40 milliGRAM(s) Oral before breakfast  tranexamic acid 2% Topical Irrigation 1 Application(s) IntraArticular once      LABS: All Labs Reviewed:                        12.4   8.66  )-----------( 231      ( 21 May 2023 09:58 )             39.2     05-21    139  |  106  |  9   ----------------------------<  118<H>  4.1   |  30  |  0.82    Ca    9.6      21 May 2023 09:58  Phos  3.3     05-21  Mg     2.0     05-21    TPro  6.9  /  Alb  3.2<L>  /  TBili  0.7  /  DBili  x   /  AST  30  /  ALT  25  /  AlkPhos  74  05-21            Blood Culture:   lipid profile       RADIOLOGY:    EKG:    Telemetry:    ECHO:

## 2023-05-22 NOTE — PROGRESS NOTE ADULT - SUBJECTIVE AND OBJECTIVE BOX
C/c: cervical stenosis    HPI:  62F, pmh of HLD, DMII, Endometrial ca s/p hysterectomy, DVT in 2010 no longer on a/c with Hx. Cervical spinal stenosis with radiculopathy, s/p C4-C6 ACDF, on 5/19/23 consulted for post op hypotension which spontaneously improved.   had syncopal episode after drain removed.     pt seen and examined this am. back to baseline. no sob/chest pain. essentially eating yogurt/liquids. Unable to swallow eggs.   ambulating without dizziness/lightheadedness.     ROS: all 10 systems reviewed and is as above otherwise negative.     Vital Signs Last 24 Hrs  T(C): 36.4 (22 May 2023 07:51), Max: 36.7 (21 May 2023 16:00)  T(F): 97.5 (22 May 2023 07:51), Max: 98.1 (21 May 2023 16:00)  HR: 72 (22 May 2023 07:51) (70 - 83)  BP: 118/76 (22 May 2023 07:51) (114/70 - 121/83)  RR: 18 (22 May 2023 07:51) (17 - 18)  SpO2: 98% (22 May 2023 07:51) (96% - 99%)    Parameters below as of 22 May 2023 07:51  Patient On (Oxygen Delivery Method): room air        PHYSICAL EXAM:    GENERAL: Comfortable, no acute distress   HEAD:  Normocephalic, atraumatic  EYES: EOMI, PERRLA  HEENT: Moist mucous membranes, +c collar in place  NECK: Supple, No JVD  NERVOUS SYSTEM:  Alert & Oriented X3, Motor Strength 5/5 B/L upper and lower extremities  CHEST/LUNG: Clear to auscultation bilaterally  HEART: Regular rate and rhythm  ABDOMEN: Soft, Nontender, Nondistended, Bowel sounds present  GENITOURINARY: Voiding, no palpable bladder  EXTREMITIES:   No clubbing, cyanosis, or edema  MUSCULOSKELETAL- No muscle tenderness, no joint tenderness  SKIN-no rash      LABS:                        12.4   8.66  )-----------( 231      ( 21 May 2023 09:58 )             39.2     05-21    139  |  106  |  9   ----------------------------<  118<H>  4.1   |  30  |  0.82    Ca    9.6      21 May 2023 09:58  Phos  3.3     05-21  Mg     2.0     05-21    TPro  6.9  /  Alb  3.2<L>  /  TBili  0.7  /  DBili  x   /  AST  30  /  ALT  25  /  AlkPhos  74  05-21    MEDICATIONS  (STANDING):  atorvastatin 20 milliGRAM(s) Oral at bedtime  dextrose 5%. 1000 milliLiter(s) (50 mL/Hr) IV Continuous <Continuous>  dextrose 5%. 1000 milliLiter(s) (100 mL/Hr) IV Continuous <Continuous>  dextrose 50% Injectable 25 Gram(s) IV Push once  dextrose 50% Injectable 25 Gram(s) IV Push once  dextrose 50% Injectable 12.5 Gram(s) IV Push once  gabapentin 300 milliGRAM(s) Oral three times a day  glucagon  Injectable 1 milliGRAM(s) IntraMuscular once  insulin lispro (ADMELOG) corrective regimen sliding scale   SubCutaneous three times a day before meals  lactated ringers. 1000 milliLiter(s) (75 mL/Hr) IV Continuous <Continuous>  pantoprazole    Tablet 40 milliGRAM(s) Oral before breakfast  tranexamic acid 2% Topical Irrigation 1 Application(s) IntraArticular once    MEDICATIONS  (PRN):  dextrose Oral Gel 15 Gram(s) Oral once PRN Blood Glucose LESS THAN 70 milliGRAM(s)/deciliter  HYDROmorphone  Injectable 1 milliGRAM(s) IV Push every 6 hours PRN Severe Pain (7 - 10)  ondansetron   Disintegrating Tablet 4 milliGRAM(s) Oral every 8 hours PRN Nausea and/or Vomiting  oxyCODONE    IR 5 milliGRAM(s) Oral every 6 hours PRN Moderate Pain (4 - 6)  oxyCODONE    IR 10 milliGRAM(s) Oral every 4 hours PRN Severe Pain (7 - 10)  traMADol 50 milliGRAM(s) Oral every 6 hours PRN Mild Pain (1 - 3)    ASSESSMENT AND PLAN:  62F, pmh of HLD, DMII, Endometrial ca s/p hysterectomy, DVT in 2010 no longer on a/c with Hx. Cervical spinal stenosis with radiculopathy, s/p C4-C6 ACDF, on 5/19/23 consulted for post op hypotension which spontaneously improved.     1. Probable vasovagal syncope:  -s/p ivf  -no recurrence of symptoms.   -did not get midodrine.    2. Cervical spine stenosis with radiculopathy:  -s/p C4-6 ACDF POD#3.  -C collar inplace  -drain removed.   -physical therapy  -incentive spirometry encouraged.   -d/w neurosx pa, will get one dose decadron and will be dc'd on medrol for dysphagia.      3. HLD;  -continue statin.     4. DMII:  -SS    5. DVT px  -venodynes

## 2023-05-22 NOTE — DISCHARGE NOTE PROVIDER - NSDCFUADDINST_GEN_ALL_CORE_FT
Advance diet and activity as tolerated  Avoid heavy lifting, bending or twisting  Lift nothing more than 10 lbs  Take pain medications as prescribed  Drink plenty of water to avoid constipation, take over the counter stool softeners if needed  Patient may shower, wash incision with soap and water and pat dry, do not submerge incision under water-- no bath tubs, hot tubs, or swimming pools  If you develop redness, swelling, bleeding, discharge, fever, or increased pain please call the office   If you develop chest pain or shortness of breath please go to the nearest emergency room   Wear cervical collar at all times except for showering and eating.   Please do not hesitate to call the office with any questions or concerns

## 2023-05-22 NOTE — DISCHARGE NOTE PROVIDER - CARE PROVIDER_API CALL
Edmund Ruiz (DO)  Orthopaedic Surgery  [address] [place] [address], [address] [place] [address]  Phone: [phone]  Fax: [phone]  Follow Up Time: 2 weeks

## 2023-05-22 NOTE — DISCHARGE NOTE NURSING/CASE MANAGEMENT/SOCIAL WORK - NSDCPEFALRISK_GEN_ALL_CORE
For information on Fall & Injury Prevention, visit: [email or web address] OR  [email or web address] OR  https://www.cdc.gov/steadi/patient.html

## 2023-05-22 NOTE — CONSULT NOTE ADULT - REASON FOR ADMISSION
" OV   12/19/2017  Assessment:     1. Acute maxillary sinusitis     2. Splenic infarct  INR   3. Arterial fibromuscular dysplasia     4. Visit for screening mammogram        Plan:      We will cover with Azithromycin as directed for acute maxillary and ethmoid sinusitis. We reviewed use of OTC analgesics, decongestants, nasal steroids, and/or mucinex, as well as increased fluids, rest and humidity, etc. She will call or return to clinic with any ongoing or worsening symptoms. INR was drawn as well today and she will continue with monitoring per the ACM team, and regular follow up with [facility]. As far as HCM, she reports having had a mammogram at [facility] this year.      Subjective:           Silvia Martinez presents for evaluation of sore throat and productive cough with sputum described as yellow. Symptoms began 1 week ago. Symptoms had improved but now are worse again over the last few days. Cough is described as productive of green/yellow sputum, paroxysmal. Symptoms are associated with fevers, purulent rhinorrhea and sinus pressure, decrease in energy level and headache. She denies hemoptysis, shortness of breath and wheezing. She reports reclining position aggravates the cough. Past history is significant for pneumonia last spring.        She continues on chronic anticoagulation with coumadin for her hx of splenic infarct, celiac artery dissection and fibromuscular dysplasia. She sees [facility] for monitoring otherwise.    The following portions of the patient's history were reviewed and updated as appropriate: allergies, current medications, past family history, past medical history, past social history, past surgical history and problem list.    Review of Systems  Pertinent items are noted in HPI.  mammo in march - done at [facility]  She is s/p hysterectomy for benign reasons        Objective:        /62  Pulse 64  Ht 5' 6.75\" (1.695 m)  Wt 202 lb 4 oz (91.7 kg)  BMI 31.91 kg/m2  General     Alert, "
surgery
cooperative, no distress   Head:    Normocephalic, without obvious abnormality, atraumatic   Eyes:    PERRL, conjunctiva/corneas clear, EOM's intact   Ears:    Normal TM's and external ear canals, both ears   Nose:   Nasal mucosa erythematous with purulent drainage, and moderate bilateral maxillary and ethmoid sinus tenderness   Throat:  mild oropharyngeal erythema    Neck:   Supple, moderate anterior cervical adenopathy and supple, symmetrical, trachea midline    Lungs:     clear to auscultation bilaterally   Heart :    Regular rate and rhythm, no murmur, rub or gallop   Abdomen:     Soft, non-tender, no masses   Skin:   Skin color, texture, turgor normal, no rashes or lesions               
C4-C6 ACDF,

## 2023-05-22 NOTE — DISCHARGE NOTE PROVIDER - NSDCCPCAREPLAN_GEN_ALL_CORE_FT
PRINCIPAL DISCHARGE DIAGNOSIS  Diagnosis: Cervical spinal stenosis  Assessment and Plan of Treatment: s/p anterior cervical discectomy and fusion C4-C6  Wear cervical collar at all times, can be removed for showering and while eating   Take over the counter Tylenol as needed for pain- avoid NSAIDS- no Advil, no Motrin, No Aleive   Pt may shower, revove collar, may wash incision with soap and water and pat dry  Do not submerge incision under water- no bath tubs, hot tubs, or swimming pools.   Avoid lifting anything heavier than 10 lbs  Steroid dose packet was prescribed for difficulty swallowing-- take as directed, if unable to swallow solid foods please call the office.   Call the office with any questions or concerns.      SECONDARY DISCHARGE DIAGNOSES  Diagnosis: DM (diabetes mellitus)  Assessment and Plan of Treatment: continue all home medicaions and follow up with primary care physician as needed    Diagnosis: HLD (hyperlipidemia)  Assessment and Plan of Treatment: continue all home medicaions and follow up with primary care physician as needed

## 2023-05-22 NOTE — DISCHARGE NOTE PROVIDER - NSDCMRMEDTOKEN_GEN_ALL_CORE_FT
cholecalciferol 50 mcg (2000 intl units) oral tablet: 1 orally once a day  Crestor 5 mg oral tablet: 1 tab(s) orally once a day  cyanocobalamin 500 mcg oral tablet: 1 orally once a day  MetFORMIN (Eqv-Fortamet) 500 mg oral tablet, extended release: orally 2 times a day  MethylPREDNISolone Dose Pack 4 mg oral tablet: 1 dose(s) orally once a day TAKE AS DIRECTED

## 2023-05-22 NOTE — DISCHARGE NOTE PROVIDER - NSDCCPTREATMENT_GEN_ALL_CORE_FT
PRINCIPAL PROCEDURE  Procedure: Anterior cervical discectomy and fusion (ACDF) at 2 levels  Findings and Treatment:

## 2023-05-22 NOTE — DISCHARGE NOTE PROVIDER - HOSPITAL COURSE
Pt is a 62 year old woman with a h/o cervical stenosis who presented through ambulatory surgery for a scheduled procedure.   Pt underwent an Anterior cervical discectomy and fusion C4-C6, she tolerated the procedure well, she was recovered in PACU and then transferred to . Pt was seen by the hospitalist service and physical therapy. Drain was removed on POD #2- pt had and episode of hypotension and was given a fluid bolus.   Today is POD #3, pt is doing well, she denies pain, she does c/o difficulty swallowing, will give [id number] IV X1 now and discharge pt home on a medrol dose pack. She is ambulating well, she is tolerating the cervical collar and has not required any pain medications. Incision is healing well, steri-strips are intact, dry dressing applied. Pt instructed to wear her collar at all times- can be removed for shower and bathing. Follow up in the office in 2 weeks. Call the office with any questions or concerns.     Vital Signs Last 24 Hrs  T(C): 36.4 (22 May 2023 07:51), Max: 36.8 (21 May 2023 12:00)  T(F): 97.5 (22 May 2023 07:51), Max: 98.3 (21 May 2023 12:00)  HR: 72 (22 May 2023 07:51) (70 - 83)  BP: 118/76 (22 May 2023 07:51) (112/71 - 121/83)  RR: 18 (22 May 2023 07:51) (17 - 18)  SpO2: 98% (22 May 2023 07:51) (96% - 100%)    Parameters below as of 22 May 2023 07:51  Patient On (Oxygen Delivery Method): room air

## 2023-05-22 NOTE — DISCHARGE NOTE NURSING/CASE MANAGEMENT/SOCIAL WORK - PATIENT PORTAL LINK FT
You can access the FollowMyHealth Patient Portal offered by [facility] by registering at the following website: [email or web address]. By joining [facility]’s FollowMyHealth portal, you will also be able to view your health information using other applications (apps) compatible with our system.

## 2023-05-23 LAB — SURGICAL PATHOLOGY STUDY: SIGNIFICANT CHANGE UP

## 2023-05-26 DIAGNOSIS — Z85.828 PERSONAL HISTORY OF OTHER MALIGNANT NEOPLASM OF SKIN: ICD-10-CM

## 2023-05-26 DIAGNOSIS — E78.5 HYPERLIPIDEMIA, UNSPECIFIED: ICD-10-CM

## 2023-05-26 DIAGNOSIS — M25.78 OSTEOPHYTE, VERTEBRAE: ICD-10-CM

## 2023-05-26 DIAGNOSIS — I95.81 POSTPROCEDURAL HYPOTENSION: ICD-10-CM

## 2023-05-26 DIAGNOSIS — M50.021 CERVICAL DISC DISORDER AT C4-C5 LEVEL WITH MYELOPATHY: ICD-10-CM

## 2023-05-26 DIAGNOSIS — M48.02 SPINAL STENOSIS, CERVICAL REGION: ICD-10-CM

## 2023-05-26 DIAGNOSIS — Z85.42 PERSONAL HISTORY OF MALIGNANT NEOPLASM OF OTHER PARTS OF UTERUS: ICD-10-CM

## 2023-05-26 DIAGNOSIS — R55 SYNCOPE AND COLLAPSE: ICD-10-CM

## 2023-05-26 DIAGNOSIS — M50.121 CERVICAL DISC DISORDER AT C4-C5 LEVEL WITH RADICULOPATHY: ICD-10-CM
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
## 2023-05-26 DIAGNOSIS — M47.12 OTHER SPONDYLOSIS WITH MYELOPATHY, CERVICAL REGION: ICD-10-CM

## 2023-05-26 DIAGNOSIS — M47.22 OTHER SPONDYLOSIS WITH RADICULOPATHY, CERVICAL REGION: ICD-10-CM

## 2023-05-26 DIAGNOSIS — Z86.718 PERSONAL HISTORY OF OTHER VENOUS THROMBOSIS AND EMBOLISM: ICD-10-CM

## 2023-05-26 DIAGNOSIS — E11.9 TYPE 2 DIABETES MELLITUS WITHOUT COMPLICATIONS: ICD-10-CM

## 2023-06-13 NOTE — H&P PST ADULT - PRO INTERPRETER NEED 2
Mike Conwaywa  69 year old  male        SUBJECTIVE:  The patient is a 70 yo male with biopsy proven infiltrative carcinoma  involving the left lateral superior forehead, here for Mohs surgery, given the location and the need for tissue preservation.  The diagnosis and the procedure were explained to the patient.  The risk of scar and recurrence was discussed.  After that will follow.     PREOPERATIVE DIAGNOSIS:  Infiltrative primary cutaneous carcinoma.     POSTOPERATIVE DIAGNOSIS:  Infiltrative basal cell carcinoma     ANESTHESIA:  Local.  A total of 7 cc was used.      PROCEDURE:  Mohs surgery and complex closure.      FINDINGS AND PROCEDURE:  The patient was placed in supine position.  The area was prepped and draped in usual fashion.  It required a total of 2 layers of Mohs for clear margin and the first required 2 tissue blocks with the defect measured 1.5 x 1.1 cm and down to the deep subcutaneous tissue.  The second layer required 1 tissue block and down to the underlying muscle. All tissue blocks were then horizontally sectioned, routine stain with H(Hematoxylin) and E (Eosin) and microscopic examination for clear margin.  The final defect measured 1.6 x 1.2 cm.  Hemostasis was obtained with aluminum chloride and electrocautery.  Given the location of the defect and concerns about healing process, the patient would like to have it repaired. With that, the patient was placed in a supine position. The defect was converted into modified elliptical marking along relaxed skin tension lines.  Local anesthesia was obtained.  The area was prepped and draped in usual fashion. Utilizing a #15 blade, incision was then carried along the marking down to the deep subcutaneous .  It was removed. Hemostasis was then obtained using electrocautery. The wound was widely undermined with bilateral widths measured at least 1.2 cm given the tightness of surrounding tissue, to help minimize closure tension and help wound edge  eversion. Hemostasis was again obtained. The wound edges were approximated using multiple subdermal stitches with 5.0 Vicryl. Then superficially the wound then closed with running stitch using it 6.0 Ethilon. Pressure dressing was then placed. Wound care instructions were given. We will see patient back in the clinic in 5-7 days for suture removal. Overall, the patient had tolerated the procedure very well. Estimated blood loss less than 1 mL and there were no complications.  Final length of the surgical wound was 4.2 cm.           English

## 2023-06-14 ENCOUNTER — APPOINTMENT (OUTPATIENT)
Dept: GYNECOLOGIC ONCOLOGY | Facility: CLINIC | Age: 63
End: 2023-06-14
Payer: COMMERCIAL

## 2023-06-14 ENCOUNTER — NON-APPOINTMENT (OUTPATIENT)
Age: 63
End: 2023-06-14

## 2023-06-14 VITALS — BODY MASS INDEX: 32.61 KG/M2 | WEIGHT: 191 LBS | HEIGHT: 64 IN

## 2023-06-14 PROCEDURE — 99212 OFFICE O/P EST SF 10 MIN: CPT

## 2023-06-15 PROBLEM — C44.91 BASAL CELL CARCINOMA OF SKIN, UNSPECIFIED: Chronic | Status: ACTIVE | Noted: 2023-05-11

## 2023-06-15 PROBLEM — M48.02 SPINAL STENOSIS, CERVICAL REGION: Chronic | Status: ACTIVE | Noted: 2023-05-11

## 2023-06-15 PROBLEM — M50.20 OTHER CERVICAL DISC DISPLACEMENT, UNSPECIFIED CERVICAL REGION: Chronic | Status: ACTIVE | Noted: 2023-05-11

## 2023-06-16 NOTE — ASSESSMENT
[FreeTextEntry1] : Pt is a 63 yo with Stage Ia endometroid adenocarcinoma s/p TRH, BSO, SLND on 05/19/2020, superficial myometrial invasion, no LVI, negative lymph nodes, Grade 1. No risk factors and no recommendation for adjuvant treatment. No evidence of disease. Normal physical exam today.

## 2023-06-16 NOTE — PHYSICAL EXAM
[Chaperone Present] : A chaperone was present in the examining room during all aspects of the physical examination [Absent] : Adnexa(ae): Absent [Normal] : Anus and perineum: Normal sphincter tone, no masses, no prolapse. [Fully active, able to carry on all pre-disease performance without restriction] : Status 0 - Fully active, able to carry on all pre-disease performance without restriction [FreeTextEntry1] : Emily Bhakta MA was present the entire duration of the patient interaction and gynecological exam. [de-identified] : no discharge [de-identified] : no nodularity, no masses

## 2023-06-16 NOTE — END OF VISIT
[FreeTextEntry3] : RTC in 1 year for surveillance\par 6 months with Dr. Sorto [FreeTextEntry2] : Emily Bhakta MA was present the entire duration of the patient interaction and gynecological exam.\par

## 2023-06-16 NOTE — HISTORY OF PRESENT ILLNESS
[FreeTextEntry1] : Pt is a 61 yo with Stage Ia endometroid adenocarcinoma s/p TRH, BSO, SLND on 05/19/2020, superficial myometrial invasion, no LVI, negative lymph nodes, Grade 1. No risk factors and no recommendation for adjuvant treatment. Pt presents to office for a six month SVL visit. \par \par She reports having knee surgery and having complication with MRSA infection. She has now recovered. She otherwise has no new gynecologic issues. They are considering moving to [place]. \par \par \par Health maintenance: \par Mammo 06/30/22: BI RADS2 \par Dexa 03/23/2021: Normal

## 2023-06-16 NOTE — REASON FOR VISIT
[FreeTextEntry1] : [facility] Location \par \par [facility]. [phone]\par \par

## 2023-06-23 RX ORDER — CLOBETASOL PROPIONATE 0.5 MG/G
0.05 CREAM TOPICAL
Qty: 1 | Refills: 0 | Status: DISCONTINUED | COMMUNITY
Start: 2023-06-16 | End: 2023-06-23

## 2023-07-07 ENCOUNTER — RESULT REVIEW (OUTPATIENT)
Age: 63
End: 2023-07-07

## 2023-07-07 ENCOUNTER — OUTPATIENT (OUTPATIENT)
Dept: OUTPATIENT SERVICES | Facility: HOSPITAL | Age: 63
LOS: 1 days | End: 2023-07-07
Payer: COMMERCIAL

## 2023-07-07 ENCOUNTER — APPOINTMENT (OUTPATIENT)
Dept: MAMMOGRAPHY | Facility: CLINIC | Age: 63
End: 2023-07-07
Payer: COMMERCIAL

## 2023-07-07 DIAGNOSIS — Z98.890 OTHER SPECIFIED POSTPROCEDURAL STATES: Chronic | ICD-10-CM

## 2023-07-07 DIAGNOSIS — Z90.710 ACQUIRED ABSENCE OF BOTH CERVIX AND UTERUS: Chronic | ICD-10-CM

## 2023-07-07 DIAGNOSIS — Z00.8 ENCOUNTER FOR OTHER GENERAL EXAMINATION: ICD-10-CM

## 2023-07-07 PROCEDURE — 77067 SCR MAMMO BI INCL CAD: CPT

## 2023-07-07 PROCEDURE — 77063 BREAST TOMOSYNTHESIS BI: CPT | Mod: 26

## 2023-07-07 PROCEDURE — 77067 SCR MAMMO BI INCL CAD: CPT | Mod: 26

## 2023-07-07 PROCEDURE — 77063 BREAST TOMOSYNTHESIS BI: CPT

## 2023-07-10 DIAGNOSIS — R92.8 OTHER ABNORMAL AND INCONCLUSIVE FINDINGS ON DIAGNOSTIC IMAGING OF BREAST: ICD-10-CM

## 2023-07-19 ENCOUNTER — OUTPATIENT (OUTPATIENT)
Dept: OUTPATIENT SERVICES | Facility: HOSPITAL | Age: 63
LOS: 1 days | End: 2023-07-19
Payer: COMMERCIAL

## 2023-07-19 ENCOUNTER — APPOINTMENT (OUTPATIENT)
Dept: ULTRASOUND IMAGING | Facility: CLINIC | Age: 63
End: 2023-07-19
Payer: COMMERCIAL

## 2023-07-19 ENCOUNTER — RESULT REVIEW (OUTPATIENT)
Age: 63
End: 2023-07-19

## 2023-07-19 DIAGNOSIS — Z98.890 OTHER SPECIFIED POSTPROCEDURAL STATES: Chronic | ICD-10-CM

## 2023-07-19 DIAGNOSIS — Z90.710 ACQUIRED ABSENCE OF BOTH CERVIX AND UTERUS: Chronic | ICD-10-CM

## 2023-07-19 DIAGNOSIS — Z00.8 ENCOUNTER FOR OTHER GENERAL EXAMINATION: ICD-10-CM

## 2023-07-19 PROCEDURE — 76642 ULTRASOUND BREAST LIMITED: CPT | Mod: 26,RT

## 2023-07-19 PROCEDURE — 76642 ULTRASOUND BREAST LIMITED: CPT

## 2023-07-27 ENCOUNTER — RESULT REVIEW (OUTPATIENT)
Age: 63
End: 2023-07-27

## 2023-07-27 ENCOUNTER — OUTPATIENT (OUTPATIENT)
Dept: OUTPATIENT SERVICES | Facility: HOSPITAL | Age: 63
LOS: 1 days | End: 2023-07-27
Payer: COMMERCIAL

## 2023-07-27 ENCOUNTER — APPOINTMENT (OUTPATIENT)
Dept: ULTRASOUND IMAGING | Facility: CLINIC | Age: 63
End: 2023-07-27
Payer: COMMERCIAL

## 2023-07-27 DIAGNOSIS — R92.8 OTHER ABNORMAL AND INCONCLUSIVE FINDINGS ON DIAGNOSTIC IMAGING OF BREAST: ICD-10-CM

## 2023-07-27 DIAGNOSIS — Z90.710 ACQUIRED ABSENCE OF BOTH CERVIX AND UTERUS: Chronic | ICD-10-CM

## 2023-07-27 DIAGNOSIS — Z98.890 OTHER SPECIFIED POSTPROCEDURAL STATES: Chronic | ICD-10-CM

## 2023-07-27 PROCEDURE — 77065 DX MAMMO INCL CAD UNI: CPT

## 2023-07-27 PROCEDURE — 77065 DX MAMMO INCL CAD UNI: CPT | Mod: 26,RT

## 2023-07-27 PROCEDURE — 88305 TISSUE EXAM BY PATHOLOGIST: CPT

## 2023-07-27 PROCEDURE — 88305 TISSUE EXAM BY PATHOLOGIST: CPT | Mod: 26

## 2023-07-27 PROCEDURE — 19083 BX BREAST 1ST LESION US IMAG: CPT | Mod: RT

## 2023-07-27 PROCEDURE — 19083 BX BREAST 1ST LESION US IMAG: CPT

## 2023-07-27 PROCEDURE — A4648: CPT

## 2023-07-31 DIAGNOSIS — Z12.39 ENCOUNTER FOR OTHER SCREENING FOR MALIGNANT NEOPLASM OF BREAST: ICD-10-CM

## 2023-08-25 ENCOUNTER — APPOINTMENT (OUTPATIENT)
Dept: DERMATOLOGY | Facility: CLINIC | Age: 63
End: 2023-08-25
Payer: COMMERCIAL

## 2023-08-25 DIAGNOSIS — L81.4 OTHER MELANIN HYPERPIGMENTATION: ICD-10-CM

## 2023-08-25 DIAGNOSIS — L82.1 OTHER SEBORRHEIC KERATOSIS: ICD-10-CM

## 2023-08-25 PROCEDURE — 99214 OFFICE O/P EST MOD 30 MIN: CPT

## 2023-08-25 NOTE — ASSESSMENT
[FreeTextEntry1] : Complete skin examination is negative for malignancy; Multiple new concerns were addressed and discussed. Therapeutic options and their risks and benefits; along with multiple diagnostic possibilities were discussed at length; risks and benefits of skin biopsy and/or other further study were discussed;  Continue regular exams;  Follow up for TBSE in 6 months;  recent BCC 2/2021- wishes to continue 6 months schedule  hypertrophic D&C scar;  responded well to ILK;   intertrigo mid abdomen;  did well with fluticasone in the past;  may restart prn

## 2023-08-25 NOTE — PHYSICAL EXAM
[Full Body Skin Exam Performed] : performed [FreeTextEntry3] : Skin examination performed of the face, neck, trunk, arms, legs;  The patient is well, alert and oriented, pleasant and cooperative. Eyelids, conjunctivae, oral mucosa, digits and nails all normal.   No cervical adenopathy.  Normal findings include:  Seborrheic keratoses- multiple Angiomas + lentigines and solar damage are present in sun exposed areas; face larger, darker lentigines on L cheek;   healed D&C site;  R upper back;  erythema abdominal fold;  in midline  No lesions suspicious for malignancy.

## 2023-08-25 NOTE — HISTORY OF PRESENT ILLNESS
[de-identified] : Pt. presents for skin check; c/o few spots of concern;  Severity:  mild   Modifying factors:  none Associated symptoms:  none Context:  no association with activity  Recent BCC R upper back 2/2021

## 2023-12-13 ENCOUNTER — APPOINTMENT (OUTPATIENT)
Dept: GYNECOLOGIC ONCOLOGY | Facility: CLINIC | Age: 63
End: 2023-12-13
Payer: COMMERCIAL

## 2023-12-13 VITALS — WEIGHT: 184 LBS | HEIGHT: 64 IN | BODY MASS INDEX: 31.41 KG/M2

## 2023-12-13 PROCEDURE — 99213 OFFICE O/P EST LOW 20 MIN: CPT

## 2023-12-13 NOTE — REVIEW OF SYSTEMS
[Negative] : Musculoskeletal [Vaginal Discharge] : vaginal discharge [Incontinence] : no incontinence [FreeTextEntry4] : + urgency

## 2023-12-13 NOTE — HISTORY OF PRESENT ILLNESS
[FreeTextEntry1] : Pt is a 61 yo with Stage Ia endometroid adenocarcinoma s/p TRH, BSO, SLND on 05/19/2020, superficial myometrial invasion, no LVI, negative lymph nodes, Grade 1. No risk factors and no recommendation for adjuvant treatment. Pt presents to office for a six month SVL visit.  Interval History: The patient reports left lower quadrant discomfort that has happend 3 times. It was associated with urine going from a "health yellow" to clear and also some associated vaginal spotting, no blood clots or clear bleeding. She reports not seeing blood in the urine just the pad. She has recently started having urinary urgency, but no foul smelling urine, no frequency, no dysuria.   Health maintenance: Mammo 07/2023 Dexa 03/23/2021: Normal Colonoscopy: - needs GI follow up

## 2023-12-13 NOTE — END OF VISIT
[FreeTextEntry3] : Follow up for telehealth after CT scan Follow up with Urogynecology dr. Ceron [FreeTextEntry2] : Emily Bhakta MA was present the entire duration of the patient interaction and gynecological exam.

## 2023-12-13 NOTE — ASSESSMENT
[FreeTextEntry1] : Pt is a 62 yo with Stage Ia endometroid adenocarcinoma s/p TRH, BSO, SLND on 05/19/2020, superficial myometrial invasion, no LVI, negative lymph nodes, Grade 1. Given new tenderness on plevic exam and recurrent left lower quadrant discomfort with vaginal discharge will get CT scan abdomen/pelvis with IV contrast. Patient declined MRI due to anxiety.   I would like the patient to follow up with urogynecology given new urgency symptoms, slight urethral ectropion which may be the cause of the vaginal spotting. We will also evaluate UA today for evidence of microscopic hematuria. We discussed the left lower quadrant discomfort can be due to diverticular disease or other etiology.   No concerned about recurrence currently and exam only significant for the pelvic discomfort on anterior vagina the area of the bladder. Will evaluate for evidence of urinary infection.

## 2023-12-13 NOTE — REASON FOR VISIT
[FreeTextEntry1] : [facility] [place]. [phone] [address] [place] [address]

## 2023-12-13 NOTE — PHYSICAL EXAM
[Chaperone Present] : A chaperone was present in the examining room during all aspects of the physical examination [FreeTextEntry1] : Emily Bhakta MA was present the entire duration of the patient interaction and gynecological exam. [Absent] : Adnexa(ae): Absent [Normal] : Anus and perineum: Normal sphincter tone, no masses, no prolapse. [de-identified] : normal, no leukoplakia or raised lesions, no active lesions [de-identified] : + urethral ectropion [de-identified] : no lesions, no ulcerations, no discharge, no blood [de-identified] : no pelvic nodularity, no masses, + tenderness anteriorly on the bladder/left lower quadrant without mass or lesion [Restricted in physically strenuous activity but ambulatory and able to carry out work of a light or sedentary nature] : Status 1- Restricted in physically strenuous activity but ambulatory and able to carry out work of a light or sedentary nature, e.g., light house work, office work

## 2024-01-05 ENCOUNTER — APPOINTMENT (OUTPATIENT)
Dept: CT IMAGING | Facility: CLINIC | Age: 64
End: 2024-01-05

## 2024-01-22 ENCOUNTER — RESULT REVIEW (OUTPATIENT)
Age: 64
End: 2024-01-22

## 2024-02-07 ENCOUNTER — APPOINTMENT (OUTPATIENT)
Dept: CT IMAGING | Facility: CLINIC | Age: 64
End: 2024-02-07
Payer: COMMERCIAL

## 2024-02-07 ENCOUNTER — OUTPATIENT (OUTPATIENT)
Dept: OUTPATIENT SERVICES | Facility: HOSPITAL | Age: 64
LOS: 1 days | End: 2024-02-07
Payer: COMMERCIAL

## 2024-02-07 DIAGNOSIS — Z98.890 OTHER SPECIFIED POSTPROCEDURAL STATES: Chronic | ICD-10-CM

## 2024-02-07 DIAGNOSIS — Z90.710 ACQUIRED ABSENCE OF BOTH CERVIX AND UTERUS: Chronic | ICD-10-CM

## 2024-02-07 DIAGNOSIS — C54.1 MALIGNANT NEOPLASM OF ENDOMETRIUM: ICD-10-CM

## 2024-02-07 PROCEDURE — 74177 CT ABD & PELVIS W/CONTRAST: CPT | Mod: 26

## 2024-02-07 PROCEDURE — 74177 CT ABD & PELVIS W/CONTRAST: CPT

## 2024-02-16 ENCOUNTER — APPOINTMENT (OUTPATIENT)
Dept: DERMATOLOGY | Facility: CLINIC | Age: 64
End: 2024-02-16
Payer: COMMERCIAL

## 2024-02-16 DIAGNOSIS — L30.4 ERYTHEMA INTERTRIGO: ICD-10-CM

## 2024-02-16 DIAGNOSIS — C44.519 BASAL CELL CARCINOMA OF SKIN OF OTHER PART OF TRUNK: ICD-10-CM

## 2024-02-16 DIAGNOSIS — L28.0 LICHEN SIMPLEX CHRONICUS: ICD-10-CM

## 2024-02-16 DIAGNOSIS — L30.9 DERMATITIS, UNSPECIFIED: ICD-10-CM

## 2024-02-16 DIAGNOSIS — D48.5 NEOPLASM OF UNCERTAIN BEHAVIOR OF SKIN: ICD-10-CM

## 2024-02-16 PROCEDURE — 99214 OFFICE O/P EST MOD 30 MIN: CPT | Mod: 25

## 2024-02-16 PROCEDURE — 11102 TANGNTL BX SKIN SINGLE LES: CPT

## 2024-02-16 RX ORDER — FLUTICASONE PROPIONATE 0.05 MG/G
0.01 OINTMENT TOPICAL
Qty: 1 | Refills: 3 | Status: ACTIVE | COMMUNITY
Start: 2022-12-09 | End: 1900-01-01

## 2024-02-16 NOTE — HISTORY OF PRESENT ILLNESS
[de-identified] : Pt. presents for skin check; c/o few spots of concern;  Severity:  mild   Modifying factors:  none Associated symptoms:  none Context:  no association with activity  Recent BCC R upper back 2/2021

## 2024-02-16 NOTE — HISTORY OF PRESENT ILLNESS
[de-identified] : Pt. presents for skin check; c/o few spots of concern;  Severity:  mild   Modifying factors:  none Associated symptoms:  none Context:  no association with activity  Recent BCC R upper back 2/2021

## 2024-02-21 ENCOUNTER — APPOINTMENT (OUTPATIENT)
Dept: GYNECOLOGIC ONCOLOGY | Facility: CLINIC | Age: 64
End: 2024-02-21
Payer: COMMERCIAL

## 2024-02-21 PROCEDURE — 99441: CPT | Mod: 93

## 2024-02-22 ENCOUNTER — NON-APPOINTMENT (OUTPATIENT)
Age: 64
End: 2024-02-22

## 2024-03-20 NOTE — H&P PST ADULT - MUSCULOSKELETAL
Principal Discharge DX:	Encounter for general medical examination  Secondary Diagnosis:	Neck pain   normal gait details… 1 decreased ROM/decreased ROM due to pain/normal gait

## 2024-03-27 ENCOUNTER — APPOINTMENT (OUTPATIENT)
Dept: OBGYN | Facility: CLINIC | Age: 64
End: 2024-03-27
Payer: COMMERCIAL

## 2024-03-27 VITALS
SYSTOLIC BLOOD PRESSURE: 116 MMHG | DIASTOLIC BLOOD PRESSURE: 60 MMHG | BODY MASS INDEX: 30.39 KG/M2 | WEIGHT: 178 LBS | HEIGHT: 64 IN

## 2024-03-27 DIAGNOSIS — Z01.419 ENCOUNTER FOR GYNECOLOGICAL EXAMINATION (GENERAL) (ROUTINE) W/OUT ABNORMAL FINDINGS: ICD-10-CM

## 2024-03-27 DIAGNOSIS — N95.2 POSTMENOPAUSAL ATROPHIC VAGINITIS: ICD-10-CM

## 2024-03-27 DIAGNOSIS — Z85.42 PERSONAL HISTORY OF MALIGNANT NEOPLASM OF OTHER PARTS OF UTERUS: ICD-10-CM

## 2024-03-27 PROCEDURE — 82270 OCCULT BLOOD FECES: CPT

## 2024-03-27 PROCEDURE — 99396 PREV VISIT EST AGE 40-64: CPT

## 2024-03-27 NOTE — PHYSICAL EXAM
[Appropriately responsive] : appropriately responsive [Alert] : alert [No Lymphadenopathy] : no lymphadenopathy [No Acute Distress] : no acute distress [Regular Rate Rhythm] : regular rate rhythm [No Murmurs] : no murmurs [Clear to Auscultation B/L] : clear to auscultation bilaterally [Soft] : soft [Non-tender] : non-tender [Non-distended] : non-distended [No Lesions] : no lesions [No HSM] : No HSM [Oriented x3] : oriented x3 [No Mass] : no mass [Examination Of The Breasts] : a normal appearance [No Masses] : no breast masses were palpable [Vulvar Atrophy] : vulvar atrophy [Labia Minora] : normal [Labia Majora] : normal [Normal] : normal [Atrophy] : atrophy [Absent] : absent [Uterine Adnexae] : normal [No Tenderness] : no tenderness [Nl Sphincter Tone] : normal sphincter tone [FreeTextEntry4] : Pap done [FreeTextEntry9] : Hemoccult negative [FreeTextEntry1] : Very mild vulvar dystrophy no lesions seen

## 2024-03-27 NOTE — HISTORY OF PRESENT ILLNESS
[FreeTextEntry1] : Patient is a 63-year-old female presents for a routine annual gynecologic examination.  Patient with a significant history of endometrial carcinoma status post robotic TLH.  Patient is being followed by GYN oncology.  Patient has no complaints.  Patient recently placed on clobetasol cream for vulvar dystrophy.  Patient states she did have an episode of vaginal bleeding CAT scan was ordered which was essentially within normal limits with the exception of several slightly enlarged lymph nodes.  Patient has been advised to have a follow-up CT scan by GYN oncology.  Patient's last mammogram was July 2023 and last bone density was March 2021

## 2024-03-27 NOTE — DISCUSSION/SUMMARY
[FreeTextEntry1] : Impression: Vulvovaginal atrophy, vulvar dystrophy, history of endometrial cancer, otherwise normal GYN exam  Rx: Hyaluronic acid vaginal suppositories-use as directed  Recommendations: Self breast exam, mammography and breast sonogram July 2024, bone density DEXA, calcium and vitamin D supplementation regular exercise, continue follow-up with GYN oncology  Follow-up in 1 year

## 2024-04-02 LAB — CYTOLOGY CVX/VAG DOC THIN PREP: ABNORMAL

## 2024-05-06 ENCOUNTER — APPOINTMENT (OUTPATIENT)
Dept: RADIOLOGY | Facility: CLINIC | Age: 64
End: 2024-05-06
Payer: COMMERCIAL

## 2024-05-06 ENCOUNTER — RESULT REVIEW (OUTPATIENT)
Age: 64
End: 2024-05-06

## 2024-05-06 ENCOUNTER — OUTPATIENT (OUTPATIENT)
Dept: OUTPATIENT SERVICES | Facility: HOSPITAL | Age: 64
LOS: 1 days | End: 2024-05-06
Payer: COMMERCIAL

## 2024-05-06 DIAGNOSIS — Z98.890 OTHER SPECIFIED POSTPROCEDURAL STATES: Chronic | ICD-10-CM

## 2024-05-06 DIAGNOSIS — Z90.710 ACQUIRED ABSENCE OF BOTH CERVIX AND UTERUS: Chronic | ICD-10-CM

## 2024-05-06 DIAGNOSIS — Z13.820 ENCOUNTER FOR SCREENING FOR OSTEOPOROSIS: ICD-10-CM

## 2024-05-06 DIAGNOSIS — Z00.8 ENCOUNTER FOR OTHER GENERAL EXAMINATION: ICD-10-CM

## 2024-05-06 PROCEDURE — 77080 DXA BONE DENSITY AXIAL: CPT | Mod: 26

## 2024-05-06 PROCEDURE — 77080 DXA BONE DENSITY AXIAL: CPT

## 2024-05-09 ENCOUNTER — NON-APPOINTMENT (OUTPATIENT)
Age: 64
End: 2024-05-09

## 2024-05-21 ENCOUNTER — APPOINTMENT (OUTPATIENT)
Dept: NUCLEAR MEDICINE | Facility: CLINIC | Age: 64
End: 2024-05-21
Payer: COMMERCIAL

## 2024-05-21 ENCOUNTER — OUTPATIENT (OUTPATIENT)
Dept: OUTPATIENT SERVICES | Facility: HOSPITAL | Age: 64
LOS: 1 days | End: 2024-05-21
Payer: COMMERCIAL

## 2024-05-21 DIAGNOSIS — Z98.890 OTHER SPECIFIED POSTPROCEDURAL STATES: Chronic | ICD-10-CM

## 2024-05-21 DIAGNOSIS — Z90.710 ACQUIRED ABSENCE OF BOTH CERVIX AND UTERUS: Chronic | ICD-10-CM

## 2024-05-21 DIAGNOSIS — C54.1 MALIGNANT NEOPLASM OF ENDOMETRIUM: ICD-10-CM

## 2024-05-21 PROCEDURE — 78815 PET IMAGE W/CT SKULL-THIGH: CPT | Mod: 26,PI

## 2024-05-21 PROCEDURE — 78815 PET IMAGE W/CT SKULL-THIGH: CPT

## 2024-05-21 PROCEDURE — A9552: CPT

## 2024-06-12 ENCOUNTER — APPOINTMENT (OUTPATIENT)
Dept: GYNECOLOGIC ONCOLOGY | Facility: CLINIC | Age: 64
End: 2024-06-12
Payer: COMMERCIAL

## 2024-06-12 VITALS
DIASTOLIC BLOOD PRESSURE: 72 MMHG | SYSTOLIC BLOOD PRESSURE: 118 MMHG | WEIGHT: 185 LBS | BODY MASS INDEX: 31.58 KG/M2 | HEIGHT: 64 IN

## 2024-06-12 DIAGNOSIS — L90.0 LICHEN SCLEROSUS ET ATROPHICUS: ICD-10-CM

## 2024-06-12 DIAGNOSIS — C54.1 MALIGNANT NEOPLASM OF ENDOMETRIUM: ICD-10-CM

## 2024-06-12 PROCEDURE — G2211 COMPLEX E/M VISIT ADD ON: CPT

## 2024-06-12 PROCEDURE — 99214 OFFICE O/P EST MOD 30 MIN: CPT

## 2024-06-12 RX ORDER — CLOBETASOL PROPIONATE 0.5 MG/G
0.05 OINTMENT TOPICAL
Qty: 1 | Refills: 0 | Status: ACTIVE | COMMUNITY
Start: 2023-06-16 | End: 1900-01-01

## 2024-06-12 NOTE — ASSESSMENT
[FreeTextEntry1] : Pt is a 63 year old female with Stage Ia endometroid adenocarcinoma s/p TRH, BSO, SLND on 05/19/2020, superficial myometrial invasion, no LVI, negative lymph nodes, Grade 1. No risk factors and no recommendation for adjuvant treatment. No evidence of disease today.  No vulvar skin lesions and recommend continued maintenance with clobetasol.   Regarding irregularity of pancreatic tail on PET CT with no associated FDG activity. She will get abdominal MRI ordered and follow up with Dr. Roy.

## 2024-06-12 NOTE — PHYSICAL EXAM
[Chaperone Present] : A chaperone was present in the examining room during all aspects of the physical examination [[id number]] : A chaperone was present during the pelvic exam. [FreeTextEntry2] : .gerri [Absent] : Adnexa(ae): Absent [Normal] : Anus and perineum: Normal sphincter tone, no masses, no prolapse. [de-identified] : soft, non-tender, no masses on incision sites [de-identified] : no lesions or discharge [de-identified] : no pelvic nodularity or masses [Restricted in physically strenuous activity but ambulatory and able to carry out work of a light or sedentary nature] : Status 1- Restricted in physically strenuous activity but ambulatory and able to carry out work of a light or sedentary nature, e.g., light house work, office work

## 2024-06-12 NOTE — END OF VISIT
[FreeTextEntry3] : RTC in 12 months or sooner with any concerns Follow up on MRI abdomen on pancreas Colonoscopy/Mammogram 7/2024 Clobetasol cream prescribed for lichen sclerosus

## 2024-06-12 NOTE — HISTORY OF PRESENT ILLNESS
[FreeTextEntry1] :  Pt is a 63 year old female with  Stage Ia endometroid adenocarcinoma s/p TRH, BSO, SLND on 05/19/2020, superficial myometrial invasion, no LVI, negative lymph nodes, Grade 1. No risk factors and no recommendation for adjuvant treatment.   last appointment was 2/21/24 TEB: discussed CT results from 1/22/24 that showed 1.6 x 1.2 left external iliac node and additional small nodes. The radiologist felt they are worth following but they are not clearly pathologic.  POC: PET/CT in 3 months (05/2024) if lymph nodes are abnormal the need to biopsy, if negative avoid further imaging.   Interval History: She reports she did some research and MRI's are no longer as closed as previously so she is willing to get it done with Angi. She will also follow up with Dr. Roy following the MRI. No new gynecologic issues, no vaginal bleeding or discharge.   PET/CT (5/21/24): IMPRESSION: 1. No FDG avid lymphadenopathy or distant disease. 2. Contour abnormality in the pancreatic tail, similar to CT 2/7/2024, and non-FDG avid. Recommend MR for further characterization. This is possibly an intrapancreatic splenule and a mass lesion needs to be excluded. ** patient refused MRI, Dr finch recommended her to schedule an appointment with surg onc. Appointment with Dr Bari Roy scheduled for 6/25/24  PAP TEST (3/21/24): Negative butt intraepithelial lesion or malignancy    Health Maintenance Mammogram: follow up on mammogram 7/2024 DEXA (5/6/24) normal bone density, start calcium citrate 600mg and vitamin D3 2,000 IU, repeat testing in 5 years  Colonoscopy: 7/2024 has it scheduled Vaccines: n/a

## 2024-06-23 PROBLEM — Q45.3 PANCREATIC ABNORMALITY: Status: ACTIVE | Noted: 2024-06-23

## 2024-06-23 PROBLEM — Z76.89 ENCOUNTER TO ESTABLISH CARE: Status: ACTIVE | Noted: 2024-06-23

## 2024-06-25 ENCOUNTER — APPOINTMENT (OUTPATIENT)
Dept: SURGICAL ONCOLOGY | Facility: CLINIC | Age: 64
End: 2024-06-25
Payer: COMMERCIAL

## 2024-06-25 VITALS
SYSTOLIC BLOOD PRESSURE: 142 MMHG | OXYGEN SATURATION: 100 % | BODY MASS INDEX: 31.58 KG/M2 | HEIGHT: 64 IN | WEIGHT: 185 LBS | HEART RATE: 60 BPM | DIASTOLIC BLOOD PRESSURE: 84 MMHG

## 2024-06-25 DIAGNOSIS — Q45.3 OTHER CONGENITAL MALFORMATIONS OF PANCREAS AND PANCREATIC DUCT: ICD-10-CM

## 2024-06-25 DIAGNOSIS — Z76.89 PERSONS ENCOUNTERING HEALTH SERVICES IN OTHER SPECIFIED CIRCUMSTANCES: ICD-10-CM

## 2024-06-25 PROCEDURE — G2211 COMPLEX E/M VISIT ADD ON: CPT | Mod: NC,1L

## 2024-06-25 PROCEDURE — 99204 OFFICE O/P NEW MOD 45 MIN: CPT

## 2024-07-09 ENCOUNTER — RESULT REVIEW (OUTPATIENT)
Age: 64
End: 2024-07-09

## 2024-07-09 ENCOUNTER — OUTPATIENT (OUTPATIENT)
Dept: OUTPATIENT SERVICES | Facility: HOSPITAL | Age: 64
LOS: 1 days | End: 2024-07-09
Payer: COMMERCIAL

## 2024-07-09 ENCOUNTER — APPOINTMENT (OUTPATIENT)
Dept: ULTRASOUND IMAGING | Facility: CLINIC | Age: 64
End: 2024-07-09
Payer: COMMERCIAL

## 2024-07-09 ENCOUNTER — APPOINTMENT (OUTPATIENT)
Dept: MAMMOGRAPHY | Facility: CLINIC | Age: 64
End: 2024-07-09
Payer: COMMERCIAL

## 2024-07-09 ENCOUNTER — APPOINTMENT (OUTPATIENT)
Dept: RADIOLOGY | Facility: CLINIC | Age: 64
End: 2024-07-09
Payer: COMMERCIAL

## 2024-07-09 DIAGNOSIS — Z98.890 OTHER SPECIFIED POSTPROCEDURAL STATES: Chronic | ICD-10-CM

## 2024-07-09 DIAGNOSIS — R92.8 OTHER ABNORMAL AND INCONCLUSIVE FINDINGS ON DIAGNOSTIC IMAGING OF BREAST: ICD-10-CM

## 2024-07-09 DIAGNOSIS — Z85.42 PERSONAL HISTORY OF MALIGNANT NEOPLASM OF OTHER PARTS OF UTERUS: ICD-10-CM

## 2024-07-09 DIAGNOSIS — Z90.710 ACQUIRED ABSENCE OF BOTH CERVIX AND UTERUS: Chronic | ICD-10-CM

## 2024-07-09 PROCEDURE — 76641 ULTRASOUND BREAST COMPLETE: CPT | Mod: 26,50

## 2024-07-09 PROCEDURE — 77067 SCR MAMMO BI INCL CAD: CPT | Mod: 26

## 2024-07-09 PROCEDURE — 77063 BREAST TOMOSYNTHESIS BI: CPT

## 2024-07-09 PROCEDURE — 77063 BREAST TOMOSYNTHESIS BI: CPT | Mod: 26

## 2024-07-09 PROCEDURE — 76641 ULTRASOUND BREAST COMPLETE: CPT

## 2024-07-09 PROCEDURE — 77067 SCR MAMMO BI INCL CAD: CPT

## 2024-08-14 ENCOUNTER — APPOINTMENT (OUTPATIENT)
Dept: ORTHOPEDIC SURGERY | Facility: CLINIC | Age: 64
End: 2024-08-14

## 2024-08-14 VITALS — BODY MASS INDEX: 31.58 KG/M2 | WEIGHT: 185 LBS | HEIGHT: 64 IN

## 2024-08-14 DIAGNOSIS — M75.122 COMPLETE ROTATOR CUFF TEAR OR RUPTURE OF LEFT SHOULDER, NOT SPECIFIED AS TRAUMATIC: ICD-10-CM

## 2024-08-14 DIAGNOSIS — M19.012 PRIMARY OSTEOARTHRITIS, LEFT SHOULDER: ICD-10-CM

## 2024-08-14 DIAGNOSIS — M75.22 BICIPITAL TENDINITIS, LEFT SHOULDER: ICD-10-CM

## 2024-08-14 PROCEDURE — 73010 X-RAY EXAM OF SHOULDER BLADE: CPT | Mod: LT

## 2024-08-14 PROCEDURE — 73030 X-RAY EXAM OF SHOULDER: CPT | Mod: LT

## 2024-08-14 PROCEDURE — 99204 OFFICE O/P NEW MOD 45 MIN: CPT | Mod: 25

## 2024-08-16 PROBLEM — M75.122 COMPLETE TEAR OF LEFT ROTATOR CUFF, UNSPECIFIED WHETHER TRAUMATIC: Status: ACTIVE | Noted: 2024-08-16

## 2024-08-16 PROBLEM — M75.22 BICEPS TENDINITIS OF LEFT UPPER EXTREMITY: Status: ACTIVE | Noted: 2024-08-16

## 2024-08-16 PROBLEM — M19.012 ARTHRITIS OF LEFT ACROMIOCLAVICULAR JOINT: Status: ACTIVE | Noted: 2024-08-16

## 2024-08-16 NOTE — ASSESSMENT
[FreeTextEntry1] : The patient is a 63-year-old female chief complaint of left shoulder pain.  She is right-hand dominant.  She denies any injury or overuse.  She began having pain in September 2023.  She tried a cortisone injection and physical therapy with no relief.  She now has pain at night.  It seems to be getting worse.  She has pain reaching overhead and reaching out across her body.  She has pain reaching through the 90 degree position.  She has pain reaching behind her back and has weakness trying to lift overhead.  She denies any rest pain.  Examination of the left upper extremity reveals normal neurovascular exam.  Examination of the left shoulder reveals decreased range of motion with forward elevation of 140, external rotation is 60 degrees, and internal rotation to T10.  She has pain and sensitivity over the AC joint.  She has 4/5 strength of supraspinatus with normal infraspinatus and deltoid function.  She has negative belly push and liftoff test.  She has tenderness in the bicipital groove with a positive speeds test.  She has markedly positive Neer and Ibanez impingement signs.  There is no instability or apprehension.  X-rays done in the office today of the left shoulder 3 views including AP internal and external rotation views and a weighted view shows no glenohumeral arthritis.  There is AC joint arthrosis.  There is no proximal migration.  There are no obvious tumors, mass or calcifications seen.  X-rays done in the office today of the left scapula 2 views including axillary view and scapular Y view show a type II acromion.  There is no glenohumeral arthritis.  There is AC joint arthrosis.  There are no obvious tumors, mass or calcifications seen.  An MRI from [facility] on June 14, 2024 shows a full-thickness tear of the rotator cuff.  There is 1.5 cm of retraction to the mid aspect of the humeral head on the supraspinatus.  His AC joint arthritis and biceps tendinitis.  My recommendation at this time is to proceed with a left shoulder arthroscopy with arthroscopic rotator cuff repair, arthroscopic AC joint resection and arthroscopic biceps tenodesis.  All risks, benefits and alternatives of the procedure were discussed with the patient in detail to include but not limited to infection, blood clots, failure of the repair, and persistent pain and stiffness and she wishes to proceed.  She has failed more than 3 months of conservative management.

## 2024-08-16 NOTE — HISTORY OF PRESENT ILLNESS
[10] : 10 [0] : 0 [Dull/Aching] : dull/aching [Localized] : localized [Sharp] : sharp [Intermittent] : intermittent [Rest] : rest [Part time] : Work status: part time [] : Post Surgical Visit: no [FreeTextEntry1] : Left shoulder [FreeTextEntry5] : 62 y/o patient is here with shoulder pain since September 2023, no specific injury, has been treated by an orthopedist outside the practice. therapy does not help, brought MRI from 6/14/24 [de-identified] : movement

## 2024-08-16 NOTE — HISTORY OF PRESENT ILLNESS
[10] : 10 [0] : 0 [Dull/Aching] : dull/aching [Localized] : localized [Sharp] : sharp [Intermittent] : intermittent [Rest] : rest [Part time] : Work status: part time [] : Post Surgical Visit: no [FreeTextEntry1] : Left shoulder [FreeTextEntry5] : 64 y/o patient is here with shoulder pain since September 2023, no specific injury, has been treated by an orthopedist outside the practice. therapy does not help, brought MRI from 6/14/24 [de-identified] : movement

## 2024-08-16 NOTE — ASSESSMENT
[FreeTextEntry1] : The patient is a 63-year-old female chief complaint of left shoulder pain.  She is right-hand dominant.  She denies any injury or overuse.  She began having pain in September 2023.  She tried a cortisone injection and physical therapy with no relief.  She now has pain at night.  It seems to be getting worse.  She has pain reaching overhead and reaching out across her body.  She has pain reaching through the 90 degree position.  She has pain reaching behind her back and has weakness trying to lift overhead.  She denies any rest pain.  Examination of the left upper extremity reveals normal neurovascular exam.  Examination of the left shoulder reveals decreased range of motion with forward elevation of 140, external rotation is 60 degrees, and internal rotation to T10.  She has pain and sensitivity over the AC joint.  She has 4/5 strength of supraspinatus with normal infraspinatus and deltoid function.  She has negative belly push and liftoff test.  She has tenderness in the bicipital groove with a positive speeds test.  She has markedly positive Neer and Ibanez impingement signs.  There is no instability or apprehension.  X-rays done in the office today of the left shoulder 3 views including AP internal and external rotation views and a weighted view shows no glenohumeral arthritis.  There is AC joint arthrosis.  There is no proximal migration.  There are no obvious tumors, mass or calcifications seen.  X-rays done in the office today of the left scapula 2 views including axillary view and scapular Y view show a type II acromion.  There is no glenohumeral arthritis.  There is AC joint arthrosis.  There are no obvious tumors, mass or calcifications seen.  An MRI from [facility] on June 14, 2024 shows a full-thickness tear of the rotator cuff.  There is 1.5 cm of retraction to the mid aspect of the humeral head on the supraspinatus.  His AC joint arthritis and biceps tendinitis.  My recommendation at this time is to proceed with a left shoulder arthroscopy with arthroscopic rotator cuff repair, arthroscopic AC joint resection and arthroscopic biceps tenodesis.  All risks, benefits and alternatives of the procedure were discussed with the patient in detail to include but not limited to infection, blood clots, failure of the repair, and persistent pain and stiffness and she wishes to proceed.  She has failed more than 3 months of conservative management.

## 2024-08-28 ENCOUNTER — APPOINTMENT (OUTPATIENT)
Dept: DERMATOLOGY | Facility: CLINIC | Age: 64
End: 2024-08-28
Payer: COMMERCIAL

## 2024-08-28 VITALS — HEIGHT: 64 IN | BODY MASS INDEX: 33.29 KG/M2 | WEIGHT: 195 LBS

## 2024-08-28 DIAGNOSIS — C44.519 BASAL CELL CARCINOMA OF SKIN OF OTHER PART OF TRUNK: ICD-10-CM

## 2024-08-28 DIAGNOSIS — L30.4 ERYTHEMA INTERTRIGO: ICD-10-CM

## 2024-08-28 DIAGNOSIS — L30.9 DERMATITIS, UNSPECIFIED: ICD-10-CM

## 2024-08-28 DIAGNOSIS — L81.4 OTHER MELANIN HYPERPIGMENTATION: ICD-10-CM

## 2024-08-28 DIAGNOSIS — L91.8 OTHER HYPERTROPHIC DISORDERS OF THE SKIN: ICD-10-CM

## 2024-08-28 PROCEDURE — 99214 OFFICE O/P EST MOD 30 MIN: CPT

## 2024-08-28 NOTE — ASSESSMENT
[FreeTextEntry1] : Complete skin examination is negative for malignancy; Multiple new concerns were addressed and discussed. Therapeutic options and their risks and benefits; along with multiple diagnostic possibilities were discussed at length; risks and benefits of skin biopsy and/or other further study were discussed;    Continue regular exams;  Follow up for TBSE in 6 months;  recent BCC 2/2021- wishes to continue 6 months schedule  intertrigo mid abdomen;  did well with fluticasone in the past;  may restart prn- discussed vaseline maintenance  Also may use fluticasone for fingertips-  may need stronger Rx  tags:  eyelids;  discussed cosmetic removal briefly

## 2024-08-28 NOTE — PHYSICAL EXAM
[Full Body Skin Exam Performed] : performed [FreeTextEntry3] : Skin examination performed of the face, neck, trunk, arms, legs;  The patient is well, alert and oriented, pleasant and cooperative. Eyelids, conjunctivae, oral mucosa, digits and nails all normal.   No cervical adenopathy.  Normal findings include:  Seborrheic keratoses- multiple Angiomas + lentigines and solar damage are present in sun exposed areas; face larger, darker lentigines on L cheek;   healed D&C site;  R upper back;  erythema abdominal fold;  in midline, left side  dryness, cracking of finger tips b/l-  tags: R>L eyelids No lesions suspicious for malignancy.

## 2024-08-28 NOTE — HISTORY OF PRESENT ILLNESS
[de-identified] : Pt. presents for skin check; c/o few spots of concern;  also:  c/o dryness, cracking on fingers-  itching on abdomen was worse in summer Severity:  mild   Modifying factors:  none Associated symptoms:  none Context:  no association with activity  Hx BCC R upper back 2/2021

## 2024-09-23 ENCOUNTER — APPOINTMENT (OUTPATIENT)
Dept: ORTHOPEDIC SURGERY | Facility: CLINIC | Age: 64
End: 2024-09-23
Payer: COMMERCIAL

## 2024-09-23 VITALS — HEIGHT: 64 IN | BODY MASS INDEX: 33.29 KG/M2 | WEIGHT: 195 LBS

## 2024-09-23 DIAGNOSIS — M75.122 COMPLETE ROTATOR CUFF TEAR OR RUPTURE OF LEFT SHOULDER, NOT SPECIFIED AS TRAUMATIC: ICD-10-CM

## 2024-09-23 PROCEDURE — 99214 OFFICE O/P EST MOD 30 MIN: CPT

## 2024-09-24 NOTE — HISTORY OF PRESENT ILLNESS
[10] : 10 [0] : 0 [Dull/Aching] : dull/aching [Localized] : localized [Sharp] : sharp [Intermittent] : intermittent [Rest] : rest [Part time] : Work status: part time [] : Post Surgical Visit: no [FreeTextEntry1] : Left shoulder [FreeTextEntry5] : 62 y/o patient is here for fu of her left shoulder today. States pain levels remain high and interrupt her sleep. Pain relievers do not help.  [de-identified] : movement

## 2024-09-24 NOTE — ASSESSMENT
[FreeTextEntry1] : The patient is here for left shoulder pain follow up.  She began having pain in September 2023.  She tried a cortisone injection and physical therapy with no relief.  She now has pain at night.  It seems to be getting worse.  She has pain reaching overhead and reaching out across her body.  She has pain reaching through the 90 degree position.  She has pain reaching behind her back and has weakness trying to lift overhead.  She denies any rest pain.  Left shoulder exam: Neurovascularly intact. Sensation intact. Examination of the left shoulder reveals decreased range of motion with forward elevation of 140, external rotation is 60 degrees, and internal rotation to T10.  She has pain and sensitivity over the AC joint.  She has 4/5 strength of supraspinatus with normal infraspinatus and deltoid function.  She has negative belly push and liftoff test.  She has tenderness in the bicipital groove with a positive speeds test.  She has markedly positive Neer and Ibanez impingement signs.  There is no instability or apprehension.  After lengthy discussion about preoperative, intraoperative, and postoperative courses we will continue to me forward with a left shoulder arthroscopy with arthroscopic rotator cuff repair, arthroscopic AC joint resection and arthroscopic biceps tenodesis.  All risks, benefits and alternatives of the procedure were discussed with the patient in detail to include but not limited to infection, blood clots, failure of the repair, and persistent pain and stiffness and she wishes to proceed.  She has failed more than 3 months of conservative management.

## 2024-09-24 NOTE — HISTORY OF PRESENT ILLNESS
[10] : 10 [0] : 0 [Dull/Aching] : dull/aching [Localized] : localized [Sharp] : sharp [Intermittent] : intermittent [Rest] : rest [Part time] : Work status: part time [] : Post Surgical Visit: no [FreeTextEntry1] : Left shoulder [FreeTextEntry5] : 64 y/o patient is here for fu of her left shoulder today. States pain levels remain high and interrupt her sleep. Pain relievers do not help.  [de-identified] : movement

## 2024-09-24 NOTE — HISTORY OF PRESENT ILLNESS
[10] : 10 [0] : 0 [Dull/Aching] : dull/aching [Localized] : localized [Sharp] : sharp [Intermittent] : intermittent [Rest] : rest [Part time] : Work status: part time [] : Post Surgical Visit: no [FreeTextEntry1] : Left shoulder [FreeTextEntry5] : 64 y/o patient is here for fu of her left shoulder today. States pain levels remain high and interrupt her sleep. Pain relievers do not help.  [de-identified] : movement

## 2024-10-15 ENCOUNTER — OUTPATIENT (OUTPATIENT)
Dept: OUTPATIENT SERVICES | Facility: HOSPITAL | Age: 64
LOS: 1 days | End: 2024-10-15
Payer: COMMERCIAL

## 2024-10-15 VITALS
HEART RATE: 68 BPM | HEIGHT: 64 IN | RESPIRATION RATE: 15 BRPM | TEMPERATURE: 98 F | DIASTOLIC BLOOD PRESSURE: 82 MMHG | OXYGEN SATURATION: 100 % | SYSTOLIC BLOOD PRESSURE: 126 MMHG | WEIGHT: 210.1 LBS

## 2024-10-15 DIAGNOSIS — Z98.890 OTHER SPECIFIED POSTPROCEDURAL STATES: Chronic | ICD-10-CM

## 2024-10-15 DIAGNOSIS — Z01.818 ENCOUNTER FOR OTHER PREPROCEDURAL EXAMINATION: ICD-10-CM

## 2024-10-15 DIAGNOSIS — Z98.1 ARTHRODESIS STATUS: Chronic | ICD-10-CM

## 2024-10-15 DIAGNOSIS — Z90.710 ACQUIRED ABSENCE OF BOTH CERVIX AND UTERUS: Chronic | ICD-10-CM

## 2024-10-15 LAB
ANION GAP SERPL CALC-SCNC: 2 MMOL/L — LOW (ref 5–17)
BASOPHILS # BLD AUTO: 0.03 K/UL — SIGNIFICANT CHANGE UP (ref 0–0.2)
BASOPHILS NFR BLD AUTO: 0.5 % — SIGNIFICANT CHANGE UP (ref 0–2)
BUN SERPL-MCNC: 16 MG/DL — SIGNIFICANT CHANGE UP (ref 7–23)
CALCIUM SERPL-MCNC: 10.1 MG/DL — SIGNIFICANT CHANGE UP (ref 8.5–10.1)
CHLORIDE SERPL-SCNC: 108 MMOL/L — SIGNIFICANT CHANGE UP (ref 96–108)
CO2 SERPL-SCNC: 30 MMOL/L — SIGNIFICANT CHANGE UP (ref 22–31)
CREAT SERPL-MCNC: 0.9 MG/DL — SIGNIFICANT CHANGE UP (ref 0.5–1.3)
EGFR: 72 ML/MIN/1.73M2 — SIGNIFICANT CHANGE UP
EOSINOPHIL # BLD AUTO: 0.17 K/UL — SIGNIFICANT CHANGE UP (ref 0–0.5)
EOSINOPHIL NFR BLD AUTO: 2.6 % — SIGNIFICANT CHANGE UP (ref 0–6)
GLUCOSE SERPL-MCNC: 90 MG/DL — SIGNIFICANT CHANGE UP (ref 70–99)
HCT VFR BLD CALC: 38.3 % — SIGNIFICANT CHANGE UP (ref 34.5–45)
HGB BLD-MCNC: 12.2 G/DL — SIGNIFICANT CHANGE UP (ref 11.5–15.5)
IMM GRANULOCYTES NFR BLD AUTO: 0.3 % — SIGNIFICANT CHANGE UP (ref 0–0.9)
LYMPHOCYTES # BLD AUTO: 1.9 K/UL — SIGNIFICANT CHANGE UP (ref 1–3.3)
LYMPHOCYTES # BLD AUTO: 28.7 % — SIGNIFICANT CHANGE UP (ref 13–44)
MCHC RBC-ENTMCNC: 29.9 PG — SIGNIFICANT CHANGE UP (ref 27–34)
MCHC RBC-ENTMCNC: 31.9 GM/DL — LOW (ref 32–36)
MCV RBC AUTO: 93.9 FL — SIGNIFICANT CHANGE UP (ref 80–100)
MONOCYTES # BLD AUTO: 0.47 K/UL — SIGNIFICANT CHANGE UP (ref 0–0.9)
MONOCYTES NFR BLD AUTO: 7.1 % — SIGNIFICANT CHANGE UP (ref 2–14)
NEUTROPHILS # BLD AUTO: 4.03 K/UL — SIGNIFICANT CHANGE UP (ref 1.8–7.4)
NEUTROPHILS NFR BLD AUTO: 60.8 % — SIGNIFICANT CHANGE UP (ref 43–77)
PLATELET # BLD AUTO: 233 K/UL — SIGNIFICANT CHANGE UP (ref 150–400)
POTASSIUM SERPL-MCNC: 4 MMOL/L — SIGNIFICANT CHANGE UP (ref 3.5–5.3)
POTASSIUM SERPL-SCNC: 4 MMOL/L — SIGNIFICANT CHANGE UP (ref 3.5–5.3)
RBC # BLD: 4.08 M/UL — SIGNIFICANT CHANGE UP (ref 3.8–5.2)
RBC # FLD: 12.6 % — SIGNIFICANT CHANGE UP (ref 10.3–14.5)
SODIUM SERPL-SCNC: 140 MMOL/L — SIGNIFICANT CHANGE UP (ref 135–145)
WBC # BLD: 6.62 K/UL — SIGNIFICANT CHANGE UP (ref 3.8–10.5)
WBC # FLD AUTO: 6.62 K/UL — SIGNIFICANT CHANGE UP (ref 3.8–10.5)

## 2024-10-15 PROCEDURE — 80048 BASIC METABOLIC PNL TOTAL CA: CPT

## 2024-10-15 PROCEDURE — 85025 COMPLETE CBC W/AUTO DIFF WBC: CPT

## 2024-10-15 PROCEDURE — 83036 HEMOGLOBIN GLYCOSYLATED A1C: CPT

## 2024-10-15 PROCEDURE — 93005 ELECTROCARDIOGRAM TRACING: CPT

## 2024-10-15 PROCEDURE — 99214 OFFICE O/P EST MOD 30 MIN: CPT | Mod: 25

## 2024-10-15 PROCEDURE — 36415 COLL VENOUS BLD VENIPUNCTURE: CPT

## 2024-10-15 PROCEDURE — 86803 HEPATITIS C AB TEST: CPT

## 2024-10-15 PROCEDURE — 93010 ELECTROCARDIOGRAM REPORT: CPT

## 2024-10-15 NOTE — H&P PST ADULT - ASSESSMENT
64 year old female with PMH cervical spinal stenosis s/p ACDF C4-C6 05/19/2023 with some dysphagia following, HLD, Diabetes type 2 with worsening left shoulder pain over the past year.  She is s/p evaluation with Dr. Shelley. Patient presents to PST for planned left shoulder arthroscopy, rotator cuff repair, AC joint resection , biceps tenodesis 10/25/24 with Dr. Shelley.       Plan:  1. PST instructions given; NPO status/instructions to be given by ASU   2. Pt instructed to take following meds on day of surgery: none  3. Pt instructed to take routine evening medications unless indicated   4. Stop NSAIDS ( Aspirin Aleve Motrin Mobic Diclofenac), herbal supplements, MVI, Vitamin fish oil 7 days prior to surgery unless directed by surgeon or cardiologist;   5. Medical Optimization with Dr Lockhart  6. EZ wash instructions given & mupirocin instructions given  7. Labs EKG as per surgeon request

## 2024-10-15 NOTE — H&P PST ADULT - NSICDXPASTMEDICALHX_GEN_ALL_CORE_FT
PAST MEDICAL HISTORY:  Cervical spinal stenosis     Diabetes mellitus     DVT, lower extremity approx 2010   tx with coumadin 6 months    Herniated cervical disc     High cholesterol     Sciatica yrs ago    Skin cancer, basal cell     Uterine cancer feb 2020    Vasovagal episode with blood draw     PAST MEDICAL HISTORY:  Acromioclavicular joint pain     Biceps tendinitis, left     Cervical spinal stenosis     Diabetes mellitus     DVT, lower extremity approx 2010   tx with coumadin 6 months    Herniated cervical disc     High cholesterol     History of dysphagia     Left shoulder pain     Needle phobia     Sciatica yrs ago    Skin cancer, basal cell     Torn rotator cuff     Uterine cancer feb 2020    Vasovagal episode with blood draw

## 2024-10-15 NOTE — H&P PST ADULT - HISTORY OF PRESENT ILLNESS
64 year old female with PMH cervical spinal stenosis s/p ACDF C4-C6 05/19/2023 with some dysphag 64 year old female with PMH cervical spinal stenosis s/p ACDF C4-C6 05/19/2023 with some dysphagia following, HLD, Diabetes type 2 with worsening left shoulder pain over the past year.  She is s/p evaluation with Dr. Shelley. Patient presents to [facility] for planned left shoulder arthroscopy, rotator cuff repair, AC joint resection , biceps tenodesis

## 2024-10-15 NOTE — H&P PST ADULT - NSICDXPASTSURGICALHX_GEN_ALL_CORE_FT
PAST SURGICAL HISTORY:  H/O carpal tunnel repair     H/O: hysterectomy     History of basal cell carcinoma excision     History of suburethral sling procedure      PAST SURGICAL HISTORY:  H/O carpal tunnel repair     H/O: hysterectomy     History of basal cell carcinoma excision     History of suburethral sling procedure     S/P cervical spinal fusion     S/P KELSEY-BSO

## 2024-10-15 NOTE — H&P PST ADULT - NSICDXFAMILYHX_GEN_ALL_CORE_FT
FAMILY HISTORY:  Family history of prostate cancer in father  FH: diverticulitis, mother  FHx: colon cancer, grandfather     FAMILY HISTORY:  Family history of prostate cancer in father  FH: diverticulitis, mother  FHx: colon cancer, grandfather    Aunt  Still living? Yes, Estimated age: 71-80  Family history of pulmonary embolism, Age at diagnosis: Age Unknown

## 2024-10-16 DIAGNOSIS — Z01.818 ENCOUNTER FOR OTHER PREPROCEDURAL EXAMINATION: ICD-10-CM

## 2024-10-16 LAB
A1C WITH ESTIMATED AVERAGE GLUCOSE RESULT: 5.8 % — HIGH (ref 4–5.6)
ESTIMATED AVERAGE GLUCOSE: 120 MG/DL — HIGH (ref 68–114)
HCV AB S/CO SERPL IA: 0.15 S/CO — SIGNIFICANT CHANGE UP (ref 0–0.99)
HCV AB SERPL-IMP: SIGNIFICANT CHANGE UP

## 2024-10-25 ENCOUNTER — APPOINTMENT (OUTPATIENT)
Dept: ORTHOPEDIC SURGERY | Facility: HOSPITAL | Age: 64
End: 2024-10-25

## 2024-10-25 ENCOUNTER — OUTPATIENT (OUTPATIENT)
Dept: INPATIENT UNIT | Facility: HOSPITAL | Age: 64
LOS: 1 days | Discharge: HOME | End: 2024-10-25
Payer: COMMERCIAL

## 2024-10-25 ENCOUNTER — RESULT REVIEW (OUTPATIENT)
Age: 64
End: 2024-10-25

## 2024-10-25 ENCOUNTER — TRANSCRIPTION ENCOUNTER (OUTPATIENT)
Age: 64
End: 2024-10-25

## 2024-10-25 VITALS
TEMPERATURE: 98 F | HEART RATE: 54 BPM | OXYGEN SATURATION: 100 % | WEIGHT: 212.08 LBS | SYSTOLIC BLOOD PRESSURE: 147 MMHG | RESPIRATION RATE: 16 BRPM | DIASTOLIC BLOOD PRESSURE: 79 MMHG

## 2024-10-25 VITALS
OXYGEN SATURATION: 100 % | DIASTOLIC BLOOD PRESSURE: 87 MMHG | RESPIRATION RATE: 16 BRPM | TEMPERATURE: 98 F | HEART RATE: 63 BPM | SYSTOLIC BLOOD PRESSURE: 138 MMHG

## 2024-10-25 DIAGNOSIS — Z98.1 ARTHRODESIS STATUS: Chronic | ICD-10-CM

## 2024-10-25 DIAGNOSIS — Z90.710 ACQUIRED ABSENCE OF BOTH CERVIX AND UTERUS: Chronic | ICD-10-CM

## 2024-10-25 DIAGNOSIS — Z98.890 OTHER SPECIFIED POSTPROCEDURAL STATES: Chronic | ICD-10-CM

## 2024-10-25 DIAGNOSIS — M75.122 COMPLETE ROTATOR CUFF TEAR OR RUPTURE OF LEFT SHOULDER, NOT SPECIFIED AS TRAUMATIC: ICD-10-CM

## 2024-10-25 DIAGNOSIS — M19.012 PRIMARY OSTEOARTHRITIS, LEFT SHOULDER: ICD-10-CM

## 2024-10-25 PROCEDURE — 88304 TISSUE EXAM BY PATHOLOGIST: CPT | Mod: 26

## 2024-10-25 PROCEDURE — 29826 SHO ARTHRS SRG DECOMPRESSION: CPT | Mod: LT

## 2024-10-25 PROCEDURE — 88304 TISSUE EXAM BY PATHOLOGIST: CPT

## 2024-10-25 PROCEDURE — 29824 SHO ARTHRS SRG DSTL CLAVICLC: CPT | Mod: LT

## 2024-10-25 PROCEDURE — 29828 SHO ARTHRS SRG BICP TENODSIS: CPT | Mod: LT

## 2024-10-25 PROCEDURE — C9399: CPT

## 2024-10-25 PROCEDURE — C1713: CPT

## 2024-10-25 PROCEDURE — 29827 SHO ARTHRS SRG RT8TR CUF RPR: CPT | Mod: LT

## 2024-10-25 RX ORDER — ASPIRIN 325 MG
1 TABLET ORAL
Qty: 30 | Refills: 0
Start: 2024-10-25 | End: 2024-11-23

## 2024-10-25 RX ORDER — OXYCODONE HYDROCHLORIDE 30 MG/1
1 TABLET, FILM COATED, EXTENDED RELEASE ORAL
Qty: 20 | Refills: 0
Start: 2024-10-25 | End: 2024-10-29

## 2024-10-25 RX ORDER — ONDANSETRON HCL/PF 4 MG/2 ML
4 VIAL (ML) INJECTION ONCE
Refills: 0 | Status: DISCONTINUED | OUTPATIENT
Start: 2024-10-25 | End: 2024-10-25

## 2024-10-25 RX ORDER — SODIUM CHLORIDE IRRIG SOLUTION 0.9 %
1000 SOLUTION, IRRIGATION IRRIGATION
Refills: 0 | Status: DISCONTINUED | OUTPATIENT
Start: 2024-10-25 | End: 2024-10-25

## 2024-10-25 RX ORDER — ONDANSETRON HCL/PF 4 MG/2 ML
1 VIAL (ML) INJECTION
Qty: 28 | Refills: 0
Start: 2024-10-25 | End: 2024-10-31

## 2024-10-25 RX ORDER — FENTANYL CITRATE-0.9 % NACL/PF 300MCG/30
50 PATIENT CONTROLLED ANALGESIA VIAL INJECTION
Refills: 0 | Status: DISCONTINUED | OUTPATIENT
Start: 2024-10-25 | End: 2024-10-25

## 2024-10-25 RX ORDER — OXYCODONE HYDROCHLORIDE 30 MG/1
5 TABLET, FILM COATED, EXTENDED RELEASE ORAL ONCE
Refills: 0 | Status: DISCONTINUED | OUTPATIENT
Start: 2024-10-25 | End: 2024-10-25

## 2024-10-25 RX ORDER — DOCUSATE SODIUM 100 MG
1 CAPSULE ORAL
Qty: 14 | Refills: 0
Start: 2024-10-25 | End: 2024-10-31

## 2024-10-25 NOTE — ASU DISCHARGE PLAN (ADULT/PEDIATRIC) - FINANCIAL ASSISTANCE
[facility] provides services at a reduced cost to those who are determined to be eligible through [facility]’s financial assistance program. Information regarding [facility]’s financial assistance program can be found by going to [email or web address] or by calling [phone].

## 2024-10-25 NOTE — ASU DISCHARGE PLAN (ADULT/PEDIATRIC) - ASU DC SPECIAL INSTRUCTIONSFT
Shoulder Arthroscopy Instructions  1) Your Shoulder will swell over the next 48hours and you can expect pain to get a bit worse. Your hand may swell also. Ice your shoulder plenty, continuously if possible. Fill up a plastic bag with ice, then wrap it in a towel or pillow case.     2) ACTIVITY: Elevate your hand and wiggle your wingers often (10x per hour). NO LIFTING ANYTHING with the arm. Be up and walking as much as you can tolerate. The more you move the better you will do.     3) BANDAGE: Expect some mild bloody drainage. It is normal. It may soak through the gauze and ACE bandage. This is mostly leftover Saline fluid coming out from surgery.     4) SHOWER: Remove bandage in 48hrs and place a Waterproof Band Aide over each of the 3 incisions. You can shower in 48 hours. No bath. Pat your incisions dry. No creams, no lotions.    5) Only reason to worry would be if pain got so severe that you cannot feel or wiggle your fingers. In this case you need to call or come to the ER. But as long as you can feel and wiggle your fingers, you are fine.    6) Call the office to schedule a follow up appointment to see Dr. Shelley in 10-14 days. Your Sutures will be removed at that point.    7) A pain Rx was sent electronically to your pharmacy from the office. Pick it up on the way home, if you have not yet.

## 2024-10-25 NOTE — BRIEF OPERATIVE NOTE - NSICDXBRIEFPREOP_GEN_ALL_CORE_FT
PRE-OP DIAGNOSIS:  Complete rotator cuff tear of left shoulder 25-Oct-2024 18:23:50  Murali Pastrana

## 2024-10-25 NOTE — BRIEF OPERATIVE NOTE - NSICDXBRIEFPROCEDURE_GEN_ALL_CORE_FT
PROCEDURES:  Repair of rotator cuff of left shoulder with acromioplasty 25-Oct-2024 18:24:10  Murali Pastrana

## 2024-10-25 NOTE — ASU PATIENT PROFILE, ADULT - FALL HARM RISK - UNIVERSAL INTERVENTIONS
Bed in lowest position, wheels locked, appropriate side rails in place/Call bell, personal items and telephone in reach/Instruct patient to call for assistance before getting out of bed or chair/Non-slip footwear when patient is out of bed/[place] to call system/Physically safe environment - no spills, clutter or unnecessary equipment/Purposeful Proactive Rounding/Room/bathroom lighting operational, light cord in reach

## 2024-10-25 NOTE — ASU DISCHARGE PLAN (ADULT/PEDIATRIC) - NS MD DC FALL RISK RISK
For information on Fall & Injury Prevention, visit: [email or web address] OR  [email or web address] OR  https://www.cdc.gov/steadi/patient.html

## 2024-10-25 NOTE — ASU DISCHARGE PLAN (ADULT/PEDIATRIC) - CARE PROVIDER_API CALL
Lewis Shel[facility] [place]  Orthopaedic Surgery  [address] [place] [address] [place] [address]  Phone: [phone]  Fax: [phone]  Follow Up Time: 2 weeks  
no

## 2024-10-31 DIAGNOSIS — M75.122 COMPLETE ROTATOR CUFF TEAR OR RUPTURE OF LEFT SHOULDER, NOT SPECIFIED AS TRAUMATIC: ICD-10-CM

## 2024-10-31 DIAGNOSIS — Z79.84 LONG TERM (CURRENT) USE OF ORAL HYPOGLYCEMIC DRUGS: ICD-10-CM

## 2024-10-31 DIAGNOSIS — M75.22 BICIPITAL TENDINITIS, LEFT SHOULDER: ICD-10-CM

## 2024-10-31 DIAGNOSIS — M13.812 OTHER SPECIFIED ARTHRITIS, LEFT SHOULDER: ICD-10-CM

## 2024-11-01 LAB — SURGICAL PATHOLOGY STUDY: SIGNIFICANT CHANGE UP

## 2024-11-07 ENCOUNTER — APPOINTMENT (OUTPATIENT)
Dept: ORTHOPEDIC SURGERY | Facility: CLINIC | Age: 64
End: 2024-11-07
Payer: COMMERCIAL

## 2024-11-07 DIAGNOSIS — M75.122 COMPLETE ROTATOR CUFF TEAR OR RUPTURE OF LEFT SHOULDER, NOT SPECIFIED AS TRAUMATIC: ICD-10-CM

## 2024-11-07 PROCEDURE — 99024 POSTOP FOLLOW-UP VISIT: CPT

## 2024-11-08 PROBLEM — Z87.898 PERSONAL HISTORY OF OTHER SPECIFIED CONDITIONS: Chronic | Status: ACTIVE | Noted: 2024-10-15

## 2024-11-08 PROBLEM — F40.298 OTHER SPECIFIED PHOBIA: Chronic | Status: ACTIVE | Noted: 2024-10-15

## 2024-11-09 PROBLEM — M25.519 PAIN IN UNSPECIFIED SHOULDER: Chronic | Status: ACTIVE | Noted: 2024-10-15

## 2024-11-09 PROBLEM — M75.100 UNSPECIFIED ROTATOR CUFF TEAR OR RUPTURE OF UNSPECIFIED SHOULDER, NOT SPECIFIED AS TRAUMATIC: Chronic | Status: ACTIVE | Noted: 2024-10-15

## 2024-11-09 PROBLEM — M25.512 PAIN IN LEFT SHOULDER: Chronic | Status: ACTIVE | Noted: 2024-10-15

## 2024-11-09 PROBLEM — M75.22 BICIPITAL TENDINITIS, LEFT SHOULDER: Chronic | Status: ACTIVE | Noted: 2024-10-15

## 2024-11-11 ENCOUNTER — APPOINTMENT (OUTPATIENT)
Dept: ORTHOPEDIC SURGERY | Facility: CLINIC | Age: 64
End: 2024-11-11

## 2024-11-25 ENCOUNTER — APPOINTMENT (OUTPATIENT)
Facility: CLINIC | Age: 64
End: 2024-11-25
Payer: COMMERCIAL

## 2024-11-25 VITALS — HEIGHT: 64 IN | WEIGHT: 195 LBS | BODY MASS INDEX: 33.29 KG/M2

## 2024-11-25 DIAGNOSIS — M75.22 BICIPITAL TENDINITIS, LEFT SHOULDER: ICD-10-CM

## 2024-11-25 DIAGNOSIS — M75.122 COMPLETE ROTATOR CUFF TEAR OR RUPTURE OF LEFT SHOULDER, NOT SPECIFIED AS TRAUMATIC: ICD-10-CM

## 2024-11-25 PROCEDURE — 99024 POSTOP FOLLOW-UP VISIT: CPT

## 2024-12-30 ENCOUNTER — APPOINTMENT (OUTPATIENT)
Facility: CLINIC | Age: 64
End: 2024-12-30
Payer: COMMERCIAL

## 2024-12-30 VITALS — HEIGHT: 64 IN | BODY MASS INDEX: 33.29 KG/M2 | WEIGHT: 195 LBS

## 2024-12-30 DIAGNOSIS — M75.122 COMPLETE ROTATOR CUFF TEAR OR RUPTURE OF LEFT SHOULDER, NOT SPECIFIED AS TRAUMATIC: ICD-10-CM

## 2024-12-30 DIAGNOSIS — M75.22 BICIPITAL TENDINITIS, LEFT SHOULDER: ICD-10-CM

## 2024-12-30 PROCEDURE — 99024 POSTOP FOLLOW-UP VISIT: CPT

## 2025-02-03 ENCOUNTER — APPOINTMENT (OUTPATIENT)
Facility: CLINIC | Age: 65
End: 2025-02-03
Payer: COMMERCIAL

## 2025-02-03 VITALS — WEIGHT: 195 LBS | HEIGHT: 64 IN | BODY MASS INDEX: 33.29 KG/M2

## 2025-02-03 DIAGNOSIS — M19.012 PRIMARY OSTEOARTHRITIS, LEFT SHOULDER: ICD-10-CM

## 2025-02-03 DIAGNOSIS — M75.122 COMPLETE ROTATOR CUFF TEAR OR RUPTURE OF LEFT SHOULDER, NOT SPECIFIED AS TRAUMATIC: ICD-10-CM

## 2025-02-03 DIAGNOSIS — M75.22 BICIPITAL TENDINITIS, LEFT SHOULDER: ICD-10-CM

## 2025-02-03 PROCEDURE — 99213 OFFICE O/P EST LOW 20 MIN: CPT

## 2025-02-07 ENCOUNTER — APPOINTMENT (OUTPATIENT)
Dept: DERMATOLOGY | Facility: CLINIC | Age: 65
End: 2025-02-07
Payer: COMMERCIAL

## 2025-02-07 DIAGNOSIS — L81.4 OTHER MELANIN HYPERPIGMENTATION: ICD-10-CM

## 2025-02-07 DIAGNOSIS — C44.519 BASAL CELL CARCINOMA OF SKIN OF OTHER PART OF TRUNK: ICD-10-CM

## 2025-02-07 DIAGNOSIS — L30.4 ERYTHEMA INTERTRIGO: ICD-10-CM

## 2025-02-07 PROCEDURE — 99214 OFFICE O/P EST MOD 30 MIN: CPT

## 2025-03-31 ENCOUNTER — APPOINTMENT (OUTPATIENT)
Dept: OBGYN | Facility: CLINIC | Age: 65
End: 2025-03-31
Payer: COMMERCIAL

## 2025-03-31 VITALS
BODY MASS INDEX: 33.29 KG/M2 | SYSTOLIC BLOOD PRESSURE: 120 MMHG | HEIGHT: 64 IN | WEIGHT: 195 LBS | DIASTOLIC BLOOD PRESSURE: 60 MMHG

## 2025-03-31 DIAGNOSIS — Z85.42 PERSONAL HISTORY OF MALIGNANT NEOPLASM OF OTHER PARTS OF UTERUS: ICD-10-CM

## 2025-03-31 DIAGNOSIS — N95.2 POSTMENOPAUSAL ATROPHIC VAGINITIS: ICD-10-CM

## 2025-03-31 DIAGNOSIS — Z01.419 ENCOUNTER FOR GYNECOLOGICAL EXAMINATION (GENERAL) (ROUTINE) W/OUT ABNORMAL FINDINGS: ICD-10-CM

## 2025-03-31 DIAGNOSIS — R92.30 DENSE BREASTS, UNSPECIFIED: ICD-10-CM

## 2025-03-31 DIAGNOSIS — Z12.39 ENCOUNTER FOR OTHER SCREENING FOR MALIGNANT NEOPLASM OF BREAST: ICD-10-CM

## 2025-03-31 PROCEDURE — 99396 PREV VISIT EST AGE 40-64: CPT

## 2025-03-31 PROCEDURE — 82270 OCCULT BLOOD FECES: CPT

## 2025-04-01 LAB
CARD LOT #: NORMAL
CARD LOT EXP DATE: NORMAL
DATE COLLECTED: NORMAL
DEVELOPER LOT #: NORMAL
DEVELOPER LOT EXP DATE: NORMAL
HEMOCCULT SP1 STL QL: NEGATIVE
HPV HIGH+LOW RISK DNA PNL CVX: NOT DETECTED
QUALITY CONTROL: YES

## 2025-04-02 ENCOUNTER — APPOINTMENT (OUTPATIENT)
Facility: CLINIC | Age: 65
End: 2025-04-02
Payer: COMMERCIAL

## 2025-04-02 VITALS — HEIGHT: 64 IN | BODY MASS INDEX: 34.15 KG/M2 | WEIGHT: 200 LBS

## 2025-04-02 DIAGNOSIS — M75.122 COMPLETE ROTATOR CUFF TEAR OR RUPTURE OF LEFT SHOULDER, NOT SPECIFIED AS TRAUMATIC: ICD-10-CM

## 2025-04-02 LAB — CYTOLOGY CVX/VAG DOC THIN PREP: ABNORMAL

## 2025-04-02 PROCEDURE — 99213 OFFICE O/P EST LOW 20 MIN: CPT

## 2025-05-07 ENCOUNTER — RESULT REVIEW (OUTPATIENT)
Age: 65
End: 2025-05-07

## 2025-05-07 ENCOUNTER — APPOINTMENT (OUTPATIENT)
Dept: ULTRASOUND IMAGING | Facility: CLINIC | Age: 65
End: 2025-05-07
Payer: COMMERCIAL

## 2025-05-07 ENCOUNTER — OUTPATIENT (OUTPATIENT)
Dept: OUTPATIENT SERVICES | Facility: HOSPITAL | Age: 65
LOS: 1 days | End: 2025-05-07
Payer: COMMERCIAL

## 2025-05-07 DIAGNOSIS — Z98.890 OTHER SPECIFIED POSTPROCEDURAL STATES: Chronic | ICD-10-CM

## 2025-05-07 DIAGNOSIS — Z98.1 ARTHRODESIS STATUS: Chronic | ICD-10-CM

## 2025-05-07 DIAGNOSIS — Z00.00 ENCOUNTER FOR GENERAL ADULT MEDICAL EXAMINATION WITHOUT ABNORMAL FINDINGS: ICD-10-CM

## 2025-05-07 DIAGNOSIS — Z90.710 ACQUIRED ABSENCE OF BOTH CERVIX AND UTERUS: Chronic | ICD-10-CM

## 2025-05-07 PROCEDURE — 76856 US EXAM PELVIC COMPLETE: CPT

## 2025-05-07 PROCEDURE — 76830 TRANSVAGINAL US NON-OB: CPT | Mod: 26

## 2025-05-07 PROCEDURE — 76830 TRANSVAGINAL US NON-OB: CPT

## 2025-05-07 PROCEDURE — 76856 US EXAM PELVIC COMPLETE: CPT | Mod: 26

## 2025-06-11 ENCOUNTER — APPOINTMENT (OUTPATIENT)
Dept: GYNECOLOGIC ONCOLOGY | Facility: CLINIC | Age: 65
End: 2025-06-11
Payer: COMMERCIAL

## 2025-06-11 VITALS — WEIGHT: 190 LBS | BODY MASS INDEX: 32.44 KG/M2 | HEIGHT: 64 IN

## 2025-06-11 PROCEDURE — 99459 PELVIC EXAMINATION: CPT

## 2025-06-11 PROCEDURE — G2211 COMPLEX E/M VISIT ADD ON: CPT | Mod: NC

## 2025-06-11 PROCEDURE — 99213 OFFICE O/P EST LOW 20 MIN: CPT

## 2025-07-07 PROBLEM — K86.2 PANCREATIC CYST: Status: ACTIVE | Noted: 2025-07-07

## 2025-07-07 PROBLEM — Z09 SURGICAL FOLLOW-UP CARE: Status: ACTIVE | Noted: 2025-07-07

## 2025-07-08 ENCOUNTER — APPOINTMENT (OUTPATIENT)
Dept: SURGICAL ONCOLOGY | Facility: CLINIC | Age: 65
End: 2025-07-08
Payer: COMMERCIAL

## 2025-07-08 VITALS
WEIGHT: 190 LBS | HEART RATE: 58 BPM | SYSTOLIC BLOOD PRESSURE: 121 MMHG | BODY MASS INDEX: 32.44 KG/M2 | HEIGHT: 64 IN | OXYGEN SATURATION: 100 % | DIASTOLIC BLOOD PRESSURE: 66 MMHG | RESPIRATION RATE: 14 BRPM

## 2025-07-08 PROCEDURE — G2211 COMPLEX E/M VISIT ADD ON: CPT | Mod: NC

## 2025-07-08 PROCEDURE — 99214 OFFICE O/P EST MOD 30 MIN: CPT

## 2025-07-10 ENCOUNTER — RESULT REVIEW (OUTPATIENT)
Age: 65
End: 2025-07-10

## 2025-07-10 ENCOUNTER — APPOINTMENT (OUTPATIENT)
Dept: MAMMOGRAPHY | Facility: CLINIC | Age: 65
End: 2025-07-10
Payer: COMMERCIAL

## 2025-07-10 ENCOUNTER — OUTPATIENT (OUTPATIENT)
Dept: OUTPATIENT SERVICES | Facility: HOSPITAL | Age: 65
LOS: 1 days | End: 2025-07-10
Payer: COMMERCIAL

## 2025-07-10 ENCOUNTER — APPOINTMENT (OUTPATIENT)
Dept: ULTRASOUND IMAGING | Facility: CLINIC | Age: 65
End: 2025-07-10
Payer: COMMERCIAL

## 2025-07-10 DIAGNOSIS — Z98.890 OTHER SPECIFIED POSTPROCEDURAL STATES: Chronic | ICD-10-CM

## 2025-07-10 DIAGNOSIS — R92.8 OTHER ABNORMAL AND INCONCLUSIVE FINDINGS ON DIAGNOSTIC IMAGING OF BREAST: ICD-10-CM

## 2025-07-10 DIAGNOSIS — Z90.710 ACQUIRED ABSENCE OF BOTH CERVIX AND UTERUS: Chronic | ICD-10-CM

## 2025-07-10 DIAGNOSIS — Z98.1 ARTHRODESIS STATUS: Chronic | ICD-10-CM

## 2025-07-10 DIAGNOSIS — R92.30 DENSE BREASTS, UNSPECIFIED: ICD-10-CM

## 2025-07-10 DIAGNOSIS — Z00.8 ENCOUNTER FOR OTHER GENERAL EXAMINATION: ICD-10-CM

## 2025-07-10 PROCEDURE — 76641 ULTRASOUND BREAST COMPLETE: CPT | Mod: 26,50

## 2025-07-10 PROCEDURE — 77063 BREAST TOMOSYNTHESIS BI: CPT | Mod: 26

## 2025-07-10 PROCEDURE — 76641 ULTRASOUND BREAST COMPLETE: CPT

## 2025-07-10 PROCEDURE — 77067 SCR MAMMO BI INCL CAD: CPT | Mod: 26

## 2025-07-10 PROCEDURE — 77067 SCR MAMMO BI INCL CAD: CPT

## 2025-07-10 PROCEDURE — 77063 BREAST TOMOSYNTHESIS BI: CPT

## 2025-08-13 ENCOUNTER — APPOINTMENT (OUTPATIENT)
Dept: DERMATOLOGY | Facility: CLINIC | Age: 65
End: 2025-08-13
Payer: COMMERCIAL

## 2025-08-13 DIAGNOSIS — L82.1 OTHER SEBORRHEIC KERATOSIS: ICD-10-CM

## 2025-08-13 DIAGNOSIS — L81.4 OTHER MELANIN HYPERPIGMENTATION: ICD-10-CM

## 2025-08-13 DIAGNOSIS — C44.519 BASAL CELL CARCINOMA OF SKIN OF OTHER PART OF TRUNK: ICD-10-CM

## 2025-08-13 DIAGNOSIS — L30.4 ERYTHEMA INTERTRIGO: ICD-10-CM

## 2025-08-13 PROCEDURE — 99214 OFFICE O/P EST MOD 30 MIN: CPT
